# Patient Record
Sex: MALE | Race: WHITE | Employment: OTHER | URBAN - METROPOLITAN AREA
[De-identification: names, ages, dates, MRNs, and addresses within clinical notes are randomized per-mention and may not be internally consistent; named-entity substitution may affect disease eponyms.]

---

## 2017-02-23 ENCOUNTER — OFFICE VISIT (OUTPATIENT)
Dept: FAMILY MEDICINE CLINIC | Facility: CLINIC | Age: 67
End: 2017-02-23

## 2017-02-23 VITALS
WEIGHT: 197 LBS | BODY MASS INDEX: 27 KG/M2 | SYSTOLIC BLOOD PRESSURE: 110 MMHG | OXYGEN SATURATION: 98 % | TEMPERATURE: 97 F | RESPIRATION RATE: 18 BRPM | HEART RATE: 56 BPM | DIASTOLIC BLOOD PRESSURE: 76 MMHG

## 2017-02-23 DIAGNOSIS — N40.1 BPH ASSOCIATED WITH NOCTURIA: ICD-10-CM

## 2017-02-23 DIAGNOSIS — R35.1 BPH ASSOCIATED WITH NOCTURIA: ICD-10-CM

## 2017-02-23 DIAGNOSIS — Z00.00 LABORATORY EXAM ORDERED AS PART OF ROUTINE GENERAL MEDICAL EXAMINATION: Primary | ICD-10-CM

## 2017-02-23 DIAGNOSIS — Z00.00 ROUTINE GENERAL MEDICAL EXAMINATION AT A HEALTH CARE FACILITY: ICD-10-CM

## 2017-02-23 PROCEDURE — 99397 PER PM REEVAL EST PAT 65+ YR: CPT | Performed by: FAMILY MEDICINE

## 2017-02-23 RX ORDER — SILDENAFIL 100 MG/1
50 TABLET, FILM COATED ORAL AS NEEDED
Qty: 24 TABLET | Refills: 3 | Status: SHIPPED | COMMUNITY
Start: 2017-02-23 | End: 2018-02-06

## 2017-02-23 NOTE — PROGRESS NOTES
Presents for a complete physical.  About to retire in 9 months very active gentleman plays tennis regularly he is physically involved without chest pain or shortness of breath. He is up-to-date on colonoscopy which has been negative.   Only medicine is elizabeth

## 2017-03-01 ENCOUNTER — LAB ENCOUNTER (OUTPATIENT)
Dept: LAB | Age: 67
End: 2017-03-01
Attending: FAMILY MEDICINE
Payer: COMMERCIAL

## 2017-03-01 DIAGNOSIS — Z00.00 LABORATORY EXAM ORDERED AS PART OF ROUTINE GENERAL MEDICAL EXAMINATION: ICD-10-CM

## 2017-03-01 LAB
25-HYDROXYVITAMIN D (TOTAL): 28.2 NG/ML (ref 30–100)
BASOPHILS # BLD AUTO: 0.07 X10(3) UL (ref 0–0.1)
BASOPHILS NFR BLD AUTO: 1.3 %
EOSINOPHIL # BLD AUTO: 0.06 X10(3) UL (ref 0–0.3)
EOSINOPHIL NFR BLD AUTO: 1.1 %
ERYTHROCYTE [DISTWIDTH] IN BLOOD BY AUTOMATED COUNT: 13.1 % (ref 11.5–16)
HCT VFR BLD AUTO: 41.9 % (ref 37–53)
HGB BLD-MCNC: 13.4 G/DL (ref 13–17)
IMMATURE GRANULOCYTE COUNT: 0.01 X10(3) UL (ref 0–1)
IMMATURE GRANULOCYTE RATIO %: 0.2 %
LYMPHOCYTES # BLD AUTO: 1.43 X10(3) UL (ref 0.9–4)
LYMPHOCYTES NFR BLD AUTO: 26.1 %
MCH RBC QN AUTO: 31.8 PG (ref 27–33.2)
MCHC RBC AUTO-ENTMCNC: 32 G/DL (ref 31–37)
MCV RBC AUTO: 99.3 FL (ref 80–99)
MONOCYTES # BLD AUTO: 0.46 X10(3) UL (ref 0.1–0.6)
MONOCYTES NFR BLD AUTO: 8.4 %
NEUTROPHIL ABS PRELIM: 3.44 X10 (3) UL (ref 1.3–6.7)
NEUTROPHILS # BLD AUTO: 3.44 X10(3) UL (ref 1.3–6.7)
NEUTROPHILS NFR BLD AUTO: 62.9 %
PLATELET # BLD AUTO: 155 10(3)UL (ref 150–450)
RBC # BLD AUTO: 4.22 X10(6)UL (ref 3.8–5.8)
RED CELL DISTRIBUTION WIDTH-SD: 47.1 FL (ref 35.1–46.3)
WBC # BLD AUTO: 5.5 X10(3) UL (ref 4–13)

## 2017-03-01 PROCEDURE — 80061 LIPID PANEL: CPT

## 2017-03-01 PROCEDURE — 36415 COLL VENOUS BLD VENIPUNCTURE: CPT

## 2017-03-01 PROCEDURE — 84443 ASSAY THYROID STIM HORMONE: CPT

## 2017-03-01 PROCEDURE — 85025 COMPLETE CBC W/AUTO DIFF WBC: CPT

## 2017-03-01 PROCEDURE — 80053 COMPREHEN METABOLIC PANEL: CPT

## 2017-03-01 PROCEDURE — 82306 VITAMIN D 25 HYDROXY: CPT

## 2017-03-02 ENCOUNTER — APPOINTMENT (OUTPATIENT)
Dept: LAB | Facility: HOSPITAL | Age: 67
End: 2017-03-02
Attending: FAMILY MEDICINE
Payer: COMMERCIAL

## 2017-03-02 DIAGNOSIS — Z00.00 LABORATORY EXAM ORDERED AS PART OF ROUTINE GENERAL MEDICAL EXAMINATION: ICD-10-CM

## 2017-03-02 LAB
ALBUMIN SERPL-MCNC: 4.1 G/DL (ref 3.5–4.8)
ALP LIVER SERPL-CCNC: 64 U/L (ref 45–117)
ALT SERPL-CCNC: 17 U/L (ref 17–63)
AST SERPL-CCNC: 22 U/L (ref 15–41)
BILIRUB SERPL-MCNC: 0.5 MG/DL (ref 0.1–2)
BUN BLD-MCNC: 22 MG/DL (ref 8–20)
CALCIUM BLD-MCNC: 9.2 MG/DL (ref 8.3–10.3)
CHLORIDE: 109 MMOL/L (ref 101–111)
CHOLEST SMN-MCNC: 163 MG/DL (ref ?–200)
CO2: 23 MMOL/L (ref 22–32)
COMPLEXED PSA SERPL-MCNC: 2.01 NG/ML (ref 0.01–4)
CREAT BLD-MCNC: 1.29 MG/DL (ref 0.7–1.3)
GLUCOSE BLD-MCNC: 91 MG/DL (ref 70–99)
HDLC SERPL-MCNC: 59 MG/DL (ref 45–?)
HDLC SERPL: 2.76 {RATIO} (ref ?–4.97)
LDLC SERPL CALC-MCNC: 84 MG/DL (ref ?–130)
M PROTEIN MFR SERPL ELPH: 6.4 G/DL (ref 6.1–8.3)
NONHDLC SERPL-MCNC: 104 MG/DL (ref ?–130)
POTASSIUM SERPL-SCNC: 4.2 MMOL/L (ref 3.6–5.1)
SODIUM SERPL-SCNC: 143 MMOL/L (ref 136–144)
TRIGLYCERIDES: 102 MG/DL (ref ?–150)
TSI SER-ACNC: 2.95 MIU/ML (ref 0.35–5.5)
VLDL: 20 MG/DL (ref 5–40)

## 2017-03-02 PROCEDURE — 82272 OCCULT BLD FECES 1-3 TESTS: CPT

## 2017-03-03 ENCOUNTER — TELEPHONE (OUTPATIENT)
Dept: FAMILY MEDICINE CLINIC | Facility: CLINIC | Age: 67
End: 2017-03-03

## 2017-03-03 DIAGNOSIS — E55.9 VITAMIN D DEFICIENCY: Primary | ICD-10-CM

## 2017-03-03 DIAGNOSIS — R79.9 ABNORMAL BLOOD CHEMISTRY: ICD-10-CM

## 2017-03-03 NOTE — TELEPHONE ENCOUNTER
----- Message from Luis E Bradford DO sent at 3/2/2017  8:08 PM CST -----  Add 2000 units vit D3 daily      repat CBC blood count in 5-6 months    May have another vitamin deficiency    Call to arrange

## 2017-03-03 NOTE — TELEPHONE ENCOUNTER
LMTCB    Orders for repeat cbc and vitamin D to be done in 6 months has been placed in [address]

## 2017-04-06 RX ORDER — FAMCICLOVIR 125 MG/1
TABLET, FILM COATED ORAL
Qty: 14 TABLET | Refills: 0 | Status: SHIPPED | OUTPATIENT
Start: 2017-04-06 | End: 2017-04-06

## 2017-04-07 RX ORDER — FAMCICLOVIR 125 MG/1
TABLET, FILM COATED ORAL
Qty: 14 TABLET | Refills: 0 | Status: SHIPPED | OUTPATIENT
Start: 2017-04-07 | End: 2017-04-10

## 2017-04-10 RX ORDER — FAMCICLOVIR 125 MG/1
TABLET, FILM COATED ORAL
Qty: 14 TABLET | Refills: 0 | Status: SHIPPED | OUTPATIENT
Start: 2017-04-10 | End: 2018-02-06

## 2017-05-01 ENCOUNTER — OFFICE VISIT (OUTPATIENT)
Dept: FAMILY MEDICINE CLINIC | Facility: CLINIC | Age: 67
End: 2017-05-01

## 2017-05-01 VITALS
OXYGEN SATURATION: 98 % | WEIGHT: 196 LBS | RESPIRATION RATE: 20 BRPM | HEART RATE: 61 BPM | BODY MASS INDEX: 27 KG/M2 | DIASTOLIC BLOOD PRESSURE: 62 MMHG | SYSTOLIC BLOOD PRESSURE: 124 MMHG | TEMPERATURE: 98 F

## 2017-05-01 DIAGNOSIS — J31.0 RHINITIS, ATROPHIC: Primary | ICD-10-CM

## 2017-05-01 PROCEDURE — 99213 OFFICE O/P EST LOW 20 MIN: CPT | Performed by: FAMILY MEDICINE

## 2017-05-01 NOTE — PROGRESS NOTES
Her with nasal irritation some early morning I mattering has been using a Sudafed product for several weeks. No animals in the house. No chills fever nausea vomiting.     Vital signs normal    No rashes no nodes joints are spared he has a cobblestone ap

## 2017-07-18 ENCOUNTER — TELEPHONE (OUTPATIENT)
Dept: FAMILY MEDICINE CLINIC | Facility: CLINIC | Age: 67
End: 2017-07-18

## 2017-07-18 DIAGNOSIS — Z23 NEED FOR TDAP VACCINATION: Primary | ICD-10-CM

## 2017-07-18 NOTE — TELEPHONE ENCOUNTER
Patients wife states would like  to have a tdap immunization. Patients wife states he should not need an appointment for doctor to order this. Would like a call back.

## 2017-07-18 NOTE — TELEPHONE ENCOUNTER
LMOM that order for Tdap has been placed per JG and pt can call back to schedule Nurse Visit. Task completed.

## 2017-07-19 ENCOUNTER — NURSE ONLY (OUTPATIENT)
Dept: FAMILY MEDICINE CLINIC | Facility: CLINIC | Age: 67
End: 2017-07-19

## 2017-07-19 PROCEDURE — 90715 TDAP VACCINE 7 YRS/> IM: CPT | Performed by: FAMILY MEDICINE

## 2017-07-19 PROCEDURE — 90471 IMMUNIZATION ADMIN: CPT | Performed by: FAMILY MEDICINE

## 2017-09-28 RX ORDER — FAMCICLOVIR 125 MG/1
TABLET, FILM COATED ORAL
Qty: 14 TABLET | Refills: 0 | Status: SHIPPED | OUTPATIENT
Start: 2017-09-28 | End: 2017-11-20

## 2017-10-19 ENCOUNTER — IMMUNIZATION (OUTPATIENT)
Dept: FAMILY MEDICINE CLINIC | Facility: CLINIC | Age: 67
End: 2017-10-19

## 2017-10-19 ENCOUNTER — LAB ENCOUNTER (OUTPATIENT)
Dept: LAB | Age: 67
End: 2017-10-19
Attending: FAMILY MEDICINE
Payer: MEDICARE

## 2017-10-19 DIAGNOSIS — R79.9 ABNORMAL BLOOD CHEMISTRY: ICD-10-CM

## 2017-10-19 DIAGNOSIS — E55.9 VITAMIN D DEFICIENCY: ICD-10-CM

## 2017-10-19 PROCEDURE — G0008 ADMIN INFLUENZA VIRUS VAC: HCPCS | Performed by: FAMILY MEDICINE

## 2017-10-19 PROCEDURE — 85025 COMPLETE CBC W/AUTO DIFF WBC: CPT | Performed by: FAMILY MEDICINE

## 2017-10-19 PROCEDURE — 36415 COLL VENOUS BLD VENIPUNCTURE: CPT | Performed by: FAMILY MEDICINE

## 2017-10-19 PROCEDURE — 90653 IIV ADJUVANT VACCINE IM: CPT | Performed by: FAMILY MEDICINE

## 2017-10-19 PROCEDURE — 82306 VITAMIN D 25 HYDROXY: CPT | Performed by: FAMILY MEDICINE

## 2017-10-20 ENCOUNTER — MED REC SCAN ONLY (OUTPATIENT)
Dept: FAMILY MEDICINE CLINIC | Facility: CLINIC | Age: 67
End: 2017-10-20

## 2017-11-20 RX ORDER — FAMCICLOVIR 125 MG/1
TABLET, FILM COATED ORAL
Qty: 14 TABLET | Refills: 0 | Status: SHIPPED | OUTPATIENT
Start: 2017-11-20 | End: 2018-05-08

## 2017-12-01 RX ORDER — TERAZOSIN 2 MG/1
CAPSULE ORAL
Qty: 90 CAPSULE | Refills: 3 | Status: CANCELLED | OUTPATIENT
Start: 2017-12-01

## 2017-12-04 ENCOUNTER — TELEPHONE (OUTPATIENT)
Dept: FAMILY MEDICINE CLINIC | Facility: CLINIC | Age: 67
End: 2017-12-04

## 2017-12-04 RX ORDER — TERAZOSIN 2 MG/1
CAPSULE ORAL
Qty: 90 CAPSULE | Refills: 3 | Status: SHIPPED | OUTPATIENT
Start: 2017-12-04 | End: 2018-11-21

## 2018-02-06 ENCOUNTER — OFFICE VISIT (OUTPATIENT)
Dept: FAMILY MEDICINE CLINIC | Facility: CLINIC | Age: 68
End: 2018-02-06

## 2018-02-06 VITALS
OXYGEN SATURATION: 98 % | BODY MASS INDEX: 26.14 KG/M2 | WEIGHT: 193 LBS | SYSTOLIC BLOOD PRESSURE: 108 MMHG | DIASTOLIC BLOOD PRESSURE: 60 MMHG | RESPIRATION RATE: 18 BRPM | HEIGHT: 72 IN | HEART RATE: 47 BPM | TEMPERATURE: 98 F

## 2018-02-06 DIAGNOSIS — Z00.00 ROUTINE GENERAL MEDICAL EXAMINATION AT A HEALTH CARE FACILITY: Primary | ICD-10-CM

## 2018-02-06 DIAGNOSIS — Z00.00 ENCOUNTER FOR ANNUAL HEALTH EXAMINATION: ICD-10-CM

## 2018-02-06 DIAGNOSIS — N52.9 IMPOTENCE OF ORGANIC ORIGIN: ICD-10-CM

## 2018-02-06 DIAGNOSIS — R35.1 BENIGN PROSTATIC HYPERPLASIA WITH NOCTURIA: ICD-10-CM

## 2018-02-06 DIAGNOSIS — N40.1 BENIGN PROSTATIC HYPERPLASIA WITH NOCTURIA: ICD-10-CM

## 2018-02-06 DIAGNOSIS — B00.1 RECURRENT COLD SORES: ICD-10-CM

## 2018-02-06 PROCEDURE — G0402 INITIAL PREVENTIVE EXAM: HCPCS | Performed by: FAMILY MEDICINE

## 2018-02-06 PROCEDURE — 99397 PER PM REEVAL EST PAT 65+ YR: CPT | Performed by: FAMILY MEDICINE

## 2018-02-06 PROCEDURE — 96160 PT-FOCUSED HLTH RISK ASSMT: CPT | Performed by: FAMILY MEDICINE

## 2018-02-06 RX ORDER — SILDENAFIL 50 MG/1
50 TABLET, FILM COATED ORAL AS NEEDED
Qty: 8 TABLET | Refills: 12 | Status: SHIPPED | OUTPATIENT
Start: 2018-02-06 | End: 2021-03-09 | Stop reason: ALTCHOICE

## 2018-02-06 NOTE — PATIENT INSTRUCTIONS
Karma Hatchet Stallman's SCREENING SCHEDULE   Tests on this list are recommended by your physician but may not be covered, or covered at this frequency, by your insurer. Please check with your insurance carrier before scheduling to verify coverage.     PREVENT more often if abnormal Colonoscopy,5 Years due on 03/18/2020 Update Health Maintenance if applicable    Flex Sigmoidoscopy Screen  Covered every 5 years No results found for this or any previous visit. No flowsheet data found.      Fecal Occult Blood   [place] covered by Medicare Part B)   Orders placed or performed in visit on 07/18/17  -TETANUS, DIPHTHERIA TOXOIDS AND ACELLULAR PERTUSIS VACCINE (TDAP), >7 YEARS, IM USE    This may be covered with your prescription benefits, but Medicare does not cover unless M

## 2018-02-06 NOTE — PROGRESS NOTES
HPI:   Ryley Roberto is a 79year old male who presents for a MA (Medicare Advantage) [facility] (Once per calendar year). Request refill of ED medication. Takes medicine for benign prostatic hypertrophy.   Occasionally takes medication for cold DO as PCP - General (Family Practice)    Patient Active Problem List:     Impotence of organic origin     Recurrent cold sores     Cherry angioma     Benign prostatic hyperplasia with nocturia    Wt Readings from Last 3 Encounters:  02/06/18 : 193 lb  05/0 congestion, sinus pain or ST, mild hearing loss in the left ear. He wears ear protection whenever using a saw or snowblower. He is not certain where this came from but it has been documented previously.   LUNGS: denies shortness of breath with exertion  C Wall:  No tenderness or deformity   Heart:  Regular rate and rhythm, S1, S2 normal, no murmur, rub or gallop   Abdomen:   Soft, non-tender, bowel sounds active all four quadrants,  no masses, no organomegaly   Genitalia: Normal male       Extremities: Extr to the plan. Reinforced healthy diet, lifestyle, and exercise. Return in 1 year (on 2/6/2019).      Erlinda Luna MD, 2/6/2018     General Health     In the past six months, have you lost more than 10 pounds without trying?: 2 - No  Has your appetite 10/19/2017   Please get every year    Pneumococcal 13 (Prevnar)  Covered Once after 65 01/07/2016 Please get once after your 65th birthday    Pneumococcal 23 (Pneumovax)  Covered Once after 65 11/18/2010 Please get once after your 65th birthday    Mark Claire

## 2018-05-08 RX ORDER — FAMCICLOVIR 125 MG/1
TABLET, FILM COATED ORAL
Qty: 14 TABLET | Refills: 0 | Status: SHIPPED | OUTPATIENT
Start: 2018-05-08 | End: 2018-07-23

## 2018-07-21 ENCOUNTER — OFFICE VISIT (OUTPATIENT)
Dept: FAMILY MEDICINE CLINIC | Facility: CLINIC | Age: 68
End: 2018-07-21
Payer: COMMERCIAL

## 2018-07-21 VITALS
RESPIRATION RATE: 16 BRPM | WEIGHT: 195 LBS | HEART RATE: 56 BPM | TEMPERATURE: 99 F | BODY MASS INDEX: 26.41 KG/M2 | DIASTOLIC BLOOD PRESSURE: 76 MMHG | HEIGHT: 72 IN | SYSTOLIC BLOOD PRESSURE: 128 MMHG

## 2018-07-21 DIAGNOSIS — B02.9 HERPES ZOSTER WITHOUT COMPLICATION: Primary | ICD-10-CM

## 2018-07-21 PROCEDURE — 99213 OFFICE O/P EST LOW 20 MIN: CPT | Performed by: FAMILY MEDICINE

## 2018-07-21 NOTE — PROGRESS NOTES
HPI:    Patient ID: Herman Love is a 76year old male. Shingles   This is a new problem. Episode onset: pt noticed it 1 week ago. The problem is unchanged.  Location: Right mid thoracic going towards the right chest. The rash is characterized by re management    1. Herpes zoster without complication  - triamcinolone acetonide 0.1 % External Cream; Apply topically 2 (two) times daily as needed.   Dispense: 60 g; Refill: 3    Meds This Visit:  Signed Prescriptions Disp Refills    triamcinolone acetonide

## 2018-07-23 RX ORDER — FAMCICLOVIR 125 MG/1
TABLET, FILM COATED ORAL
Qty: 14 TABLET | Refills: 0 | Status: SHIPPED | OUTPATIENT
Start: 2018-07-23 | End: 2018-08-24

## 2018-08-24 RX ORDER — FAMCICLOVIR 125 MG/1
TABLET, FILM COATED ORAL
Qty: 14 TABLET | Refills: 0 | Status: SHIPPED | OUTPATIENT
Start: 2018-08-24 | End: 2018-12-18

## 2018-11-21 RX ORDER — TERAZOSIN 2 MG/1
CAPSULE ORAL
Qty: 90 CAPSULE | Refills: 2 | Status: SHIPPED | OUTPATIENT
Start: 2018-11-21 | End: 2019-09-03

## 2018-12-18 RX ORDER — FAMCICLOVIR 125 MG/1
TABLET, FILM COATED ORAL
Qty: 14 TABLET | Refills: 0 | Status: SHIPPED | OUTPATIENT
Start: 2018-12-18 | End: 2019-03-08

## 2019-02-13 ENCOUNTER — MA CHART PREP (OUTPATIENT)
Dept: FAMILY MEDICINE CLINIC | Facility: CLINIC | Age: 69
End: 2019-02-13

## 2019-02-28 ENCOUNTER — OFFICE VISIT (OUTPATIENT)
Dept: FAMILY MEDICINE CLINIC | Facility: CLINIC | Age: 69
End: 2019-02-28
Payer: COMMERCIAL

## 2019-02-28 VITALS
HEIGHT: 72 IN | DIASTOLIC BLOOD PRESSURE: 74 MMHG | RESPIRATION RATE: 16 BRPM | HEART RATE: 50 BPM | OXYGEN SATURATION: 98 % | WEIGHT: 193 LBS | TEMPERATURE: 98 F | BODY MASS INDEX: 26.14 KG/M2 | SYSTOLIC BLOOD PRESSURE: 112 MMHG

## 2019-02-28 DIAGNOSIS — Z13.220 ENCOUNTER FOR LIPID SCREENING FOR CARDIOVASCULAR DISEASE: ICD-10-CM

## 2019-02-28 DIAGNOSIS — Z11.59 ENCOUNTER FOR HEPATITIS C SCREENING TEST FOR LOW RISK PATIENT: ICD-10-CM

## 2019-02-28 DIAGNOSIS — Z13.6 ENCOUNTER FOR LIPID SCREENING FOR CARDIOVASCULAR DISEASE: ICD-10-CM

## 2019-02-28 DIAGNOSIS — Z12.5 ENCOUNTER FOR SCREENING FOR MALIGNANT NEOPLASM OF PROSTATE: ICD-10-CM

## 2019-02-28 DIAGNOSIS — Z12.11 SCREENING FOR MALIGNANT NEOPLASM OF COLON: ICD-10-CM

## 2019-02-28 DIAGNOSIS — Z00.00 MEDICARE ANNUAL WELLNESS VISIT, SUBSEQUENT: ICD-10-CM

## 2019-02-28 DIAGNOSIS — E55.9 HYPOVITAMINOSIS D: ICD-10-CM

## 2019-02-28 DIAGNOSIS — R00.1 BRADYCARDIA: Primary | ICD-10-CM

## 2019-02-28 DIAGNOSIS — Z13.29 SCREENING FOR THYROID DISORDER: ICD-10-CM

## 2019-02-28 PROCEDURE — 99397 PER PM REEVAL EST PAT 65+ YR: CPT | Performed by: FAMILY MEDICINE

## 2019-02-28 PROCEDURE — G0438 PPPS, INITIAL VISIT: HCPCS | Performed by: FAMILY MEDICINE

## 2019-02-28 PROCEDURE — 96160 PT-FOCUSED HLTH RISK ASSMT: CPT | Performed by: FAMILY MEDICINE

## 2019-02-28 NOTE — PROGRESS NOTES
Patient presents for annual super visit by Medicare. Please refer to the template form. Please refer to the results of screening that included tobacco use and alcohol use, depression, cognitive impairment, safety and functionality, and nutrition.

## 2019-03-04 PROCEDURE — 82274 ASSAY TEST FOR BLOOD FECAL: CPT

## 2019-03-08 RX ORDER — FAMCICLOVIR 125 MG/1
TABLET, FILM COATED ORAL
Qty: 14 TABLET | Refills: 0 | Status: SHIPPED | OUTPATIENT
Start: 2019-03-08 | End: 2020-04-29

## 2019-03-11 ENCOUNTER — LABORATORY ENCOUNTER (OUTPATIENT)
Dept: LAB | Age: 69
End: 2019-03-11
Attending: FAMILY MEDICINE
Payer: MEDICARE

## 2019-03-11 DIAGNOSIS — E55.9 HYPOVITAMINOSIS D: ICD-10-CM

## 2019-03-11 DIAGNOSIS — Z13.6 ENCOUNTER FOR LIPID SCREENING FOR CARDIOVASCULAR DISEASE: ICD-10-CM

## 2019-03-11 DIAGNOSIS — Z12.5 ENCOUNTER FOR SCREENING FOR MALIGNANT NEOPLASM OF PROSTATE: ICD-10-CM

## 2019-03-11 DIAGNOSIS — R00.1 BRADYCARDIA: ICD-10-CM

## 2019-03-11 DIAGNOSIS — Z11.59 ENCOUNTER FOR HEPATITIS C SCREENING TEST FOR LOW RISK PATIENT: ICD-10-CM

## 2019-03-11 DIAGNOSIS — Z13.220 ENCOUNTER FOR LIPID SCREENING FOR CARDIOVASCULAR DISEASE: ICD-10-CM

## 2019-03-11 DIAGNOSIS — Z13.29 SCREENING FOR THYROID DISORDER: ICD-10-CM

## 2019-03-11 LAB
ALBUMIN SERPL-MCNC: 4.3 G/DL (ref 3.4–5)
ALBUMIN/GLOB SERPL: 1.7 {RATIO} (ref 1–2)
ALP LIVER SERPL-CCNC: 63 U/L (ref 45–117)
ALT SERPL-CCNC: 20 U/L (ref 16–61)
ANION GAP SERPL CALC-SCNC: 7 MMOL/L (ref 0–18)
AST SERPL-CCNC: 18 U/L (ref 15–37)
BASOPHILS # BLD AUTO: 0.07 X10(3) UL (ref 0–0.2)
BASOPHILS NFR BLD AUTO: 1.2 %
BILIRUB SERPL-MCNC: 0.7 MG/DL (ref 0.1–2)
BUN BLD-MCNC: 21 MG/DL (ref 7–18)
BUN/CREAT SERPL: 15.8 (ref 10–20)
CALCIUM BLD-MCNC: 9.2 MG/DL (ref 8.5–10.1)
CHLORIDE SERPL-SCNC: 111 MMOL/L (ref 98–107)
CHOLEST SMN-MCNC: 185 MG/DL (ref ?–200)
CO2 SERPL-SCNC: 27 MMOL/L (ref 21–32)
COMPLEXED PSA SERPL-MCNC: 2.63 NG/ML (ref ?–4)
CREAT BLD-MCNC: 1.33 MG/DL (ref 0.7–1.3)
DEPRECATED RDW RBC AUTO: 46.5 FL (ref 35.1–46.3)
EOSINOPHIL # BLD AUTO: 0.08 X10(3) UL (ref 0–0.7)
EOSINOPHIL NFR BLD AUTO: 1.4 %
ERYTHROCYTE [DISTWIDTH] IN BLOOD BY AUTOMATED COUNT: 12.9 % (ref 11–15)
GLOBULIN PLAS-MCNC: 2.6 G/DL (ref 2.8–4.4)
GLUCOSE BLD-MCNC: 93 MG/DL (ref 70–99)
HCT VFR BLD AUTO: 45.3 % (ref 39–53)
HCV AB SERPL QL IA: NONREACTIVE
HDLC SERPL-MCNC: 53 MG/DL (ref 40–59)
HGB BLD-MCNC: 15 G/DL (ref 13–17.5)
IMM GRANULOCYTES # BLD AUTO: 0.01 X10(3) UL (ref 0–1)
IMM GRANULOCYTES NFR BLD: 0.2 %
LDLC SERPL CALC-MCNC: 114 MG/DL (ref ?–100)
LYMPHOCYTES # BLD AUTO: 1.85 X10(3) UL (ref 1–4)
LYMPHOCYTES NFR BLD AUTO: 32.1 %
M PROTEIN MFR SERPL ELPH: 6.9 G/DL (ref 6.4–8.2)
MCH RBC QN AUTO: 32.2 PG (ref 26–34)
MCHC RBC AUTO-ENTMCNC: 33.1 G/DL (ref 31–37)
MCV RBC AUTO: 97.2 FL (ref 80–100)
MONOCYTES # BLD AUTO: 0.53 X10(3) UL (ref 0.1–1)
MONOCYTES NFR BLD AUTO: 9.2 %
NEUTROPHILS # BLD AUTO: 3.22 X10 (3) UL (ref 1.5–7.7)
NEUTROPHILS # BLD AUTO: 3.22 X10(3) UL (ref 1.5–7.7)
NEUTROPHILS NFR BLD AUTO: 55.9 %
NONHDLC SERPL-MCNC: 132 MG/DL (ref ?–130)
OSMOLALITY SERPL CALC.SUM OF ELEC: 303 MOSM/KG (ref 275–295)
PLATELET # BLD AUTO: 182 10(3)UL (ref 150–450)
POTASSIUM SERPL-SCNC: 3.9 MMOL/L (ref 3.5–5.1)
RBC # BLD AUTO: 4.66 X10(6)UL (ref 3.8–5.8)
SODIUM SERPL-SCNC: 145 MMOL/L (ref 136–145)
TRIGL SERPL-MCNC: 90 MG/DL (ref 30–149)
TSI SER-ACNC: 3.27 MIU/ML (ref 0.36–3.74)
VIT D+METAB SERPL-MCNC: 51.9 NG/ML (ref 30–100)
VLDLC SERPL CALC-MCNC: 18 MG/DL (ref 0–30)
WBC # BLD AUTO: 5.8 X10(3) UL (ref 4–11)

## 2019-03-11 PROCEDURE — 85025 COMPLETE CBC W/AUTO DIFF WBC: CPT

## 2019-03-11 PROCEDURE — 36415 COLL VENOUS BLD VENIPUNCTURE: CPT

## 2019-03-11 PROCEDURE — 84443 ASSAY THYROID STIM HORMONE: CPT

## 2019-03-11 PROCEDURE — 80061 LIPID PANEL: CPT

## 2019-03-11 PROCEDURE — 80053 COMPREHEN METABOLIC PANEL: CPT

## 2019-03-11 PROCEDURE — 86803 HEPATITIS C AB TEST: CPT

## 2019-03-11 PROCEDURE — 82306 VITAMIN D 25 HYDROXY: CPT

## 2019-03-15 ENCOUNTER — TELEPHONE (OUTPATIENT)
Dept: FAMILY MEDICINE CLINIC | Facility: CLINIC | Age: 69
End: 2019-03-15

## 2019-03-15 DIAGNOSIS — R79.9 ABNORMAL BLOOD CHEMISTRY: Primary | ICD-10-CM

## 2019-03-15 NOTE — TELEPHONE ENCOUNTER
----- Message from Yessi Navarro DO sent at 3/14/2019  8:19 PM CDT -----  Blood tests look fine repeat creatinine in 3-4 months       jg

## 2019-03-22 ENCOUNTER — APPOINTMENT (OUTPATIENT)
Dept: LAB | Age: 69
End: 2019-03-22
Attending: FAMILY MEDICINE
Payer: MEDICARE

## 2019-03-22 DIAGNOSIS — Z12.11 SCREENING FOR MALIGNANT NEOPLASM OF COLON: ICD-10-CM

## 2019-05-20 RX ORDER — FAMCICLOVIR 125 MG/1
TABLET, FILM COATED ORAL
Qty: 14 TABLET | Refills: 0 | Status: SHIPPED | OUTPATIENT
Start: 2019-05-20 | End: 2019-11-29

## 2019-09-03 RX ORDER — TERAZOSIN 2 MG/1
CAPSULE ORAL
Qty: 90 CAPSULE | Refills: 1 | Status: SHIPPED | OUTPATIENT
Start: 2019-09-03 | End: 2020-01-24

## 2019-10-14 ENCOUNTER — TELEPHONE (OUTPATIENT)
Dept: FAMILY MEDICINE CLINIC | Facility: CLINIC | Age: 69
End: 2019-10-14

## 2019-11-29 RX ORDER — FAMCICLOVIR 125 MG/1
TABLET, FILM COATED ORAL
Qty: 14 TABLET | Refills: 0 | Status: SHIPPED | OUTPATIENT
Start: 2019-11-29 | End: 2020-01-08

## 2019-12-03 ENCOUNTER — OFFICE VISIT (OUTPATIENT)
Dept: FAMILY MEDICINE CLINIC | Facility: CLINIC | Age: 69
End: 2019-12-03
Payer: COMMERCIAL

## 2019-12-03 VITALS
SYSTOLIC BLOOD PRESSURE: 130 MMHG | OXYGEN SATURATION: 98 % | HEIGHT: 72 IN | DIASTOLIC BLOOD PRESSURE: 60 MMHG | RESPIRATION RATE: 16 BRPM | WEIGHT: 197 LBS | HEART RATE: 44 BPM | BODY MASS INDEX: 26.68 KG/M2 | TEMPERATURE: 98 F

## 2019-12-03 DIAGNOSIS — M54.50 ACUTE LEFT-SIDED LOW BACK PAIN WITHOUT SCIATICA: Primary | ICD-10-CM

## 2019-12-03 PROCEDURE — 99214 OFFICE O/P EST MOD 30 MIN: CPT | Performed by: INTERNAL MEDICINE

## 2019-12-03 RX ORDER — IBUPROFEN 800 MG/1
TABLET ORAL
Qty: 30 TABLET | Refills: 0 | Status: SHIPPED | OUTPATIENT
Start: 2019-12-03

## 2019-12-03 RX ORDER — PREDNISONE 50 MG/1
TABLET ORAL
Qty: 5 TABLET | Refills: 0 | Status: SHIPPED | OUTPATIENT
Start: 2019-12-03 | End: 2021-03-09 | Stop reason: ALTCHOICE

## 2019-12-03 NOTE — PROGRESS NOTES
HPI:   Ace Bowman is a 71year old male here for complaints of back pain. Pain is located at left low back. Pain is described as aching. Severity is moderate. The pain radiates to no radiation of pain.  Pain was precipitated by raking leaves, felt some t

## 2020-01-08 RX ORDER — FAMCICLOVIR 125 MG/1
TABLET, FILM COATED ORAL
Qty: 14 TABLET | Refills: 0 | Status: SHIPPED | OUTPATIENT
Start: 2020-01-08 | End: 2020-04-29

## 2020-01-20 ENCOUNTER — NURSE ONLY (OUTPATIENT)
Dept: FAMILY MEDICINE CLINIC | Facility: CLINIC | Age: 70
End: 2020-01-20
Payer: COMMERCIAL

## 2020-01-20 PROCEDURE — 90471 IMMUNIZATION ADMIN: CPT | Performed by: FAMILY MEDICINE

## 2020-01-20 PROCEDURE — 90750 HZV VACC RECOMBINANT IM: CPT | Performed by: FAMILY MEDICINE

## 2020-01-24 RX ORDER — TERAZOSIN 2 MG/1
CAPSULE ORAL
Qty: 90 CAPSULE | Refills: 0 | Status: SHIPPED | OUTPATIENT
Start: 2020-01-24 | End: 2020-06-05

## 2020-03-03 ENCOUNTER — HOSPITAL ENCOUNTER (OUTPATIENT)
Dept: GENERAL RADIOLOGY | Age: 70
Discharge: HOME | End: 2020-03-03
Attending: FAMILY MEDICINE
Payer: MEDICARE

## 2020-03-03 ENCOUNTER — OFFICE VISIT (OUTPATIENT)
Dept: FAMILY MEDICINE CLINIC | Facility: CLINIC | Age: 70
End: 2020-03-03
Payer: COMMERCIAL

## 2020-03-03 VITALS
DIASTOLIC BLOOD PRESSURE: 64 MMHG | HEIGHT: 72 IN | BODY MASS INDEX: 27.09 KG/M2 | RESPIRATION RATE: 18 BRPM | TEMPERATURE: 99 F | WEIGHT: 200 LBS | SYSTOLIC BLOOD PRESSURE: 122 MMHG | OXYGEN SATURATION: 98 % | HEART RATE: 39 BPM

## 2020-03-03 DIAGNOSIS — N40.1 BENIGN PROSTATIC HYPERPLASIA WITH NOCTURIA: ICD-10-CM

## 2020-03-03 DIAGNOSIS — Z12.5 PROSTATE CANCER SCREENING: ICD-10-CM

## 2020-03-03 DIAGNOSIS — M54.50 ACUTE LEFT-SIDED LOW BACK PAIN WITHOUT SCIATICA: ICD-10-CM

## 2020-03-03 DIAGNOSIS — N52.9 IMPOTENCE OF ORGANIC ORIGIN: ICD-10-CM

## 2020-03-03 DIAGNOSIS — Z87.19 H/O BILATERAL INGUINAL HERNIA REPAIR: ICD-10-CM

## 2020-03-03 DIAGNOSIS — R10.12 LUQ ABDOMINAL PAIN: ICD-10-CM

## 2020-03-03 DIAGNOSIS — Z00.00 ANNUAL PHYSICAL EXAM: Primary | ICD-10-CM

## 2020-03-03 DIAGNOSIS — R35.1 BENIGN PROSTATIC HYPERPLASIA WITH NOCTURIA: ICD-10-CM

## 2020-03-03 DIAGNOSIS — N18.30 CKD (CHRONIC KIDNEY DISEASE) STAGE 3, GFR 30-59 ML/MIN (HCC): Chronic | ICD-10-CM

## 2020-03-03 DIAGNOSIS — Z98.890 H/O BILATERAL INGUINAL HERNIA REPAIR: ICD-10-CM

## 2020-03-03 DIAGNOSIS — Z12.11 COLON CANCER SCREENING: ICD-10-CM

## 2020-03-03 PROCEDURE — 90471 IMMUNIZATION ADMIN: CPT | Performed by: FAMILY MEDICINE

## 2020-03-03 PROCEDURE — G0439 PPPS, SUBSEQ VISIT: HCPCS | Performed by: FAMILY MEDICINE

## 2020-03-03 PROCEDURE — 90750 HZV VACC RECOMBINANT IM: CPT | Performed by: FAMILY MEDICINE

## 2020-03-03 PROCEDURE — 99397 PER PM REEVAL EST PAT 65+ YR: CPT | Performed by: FAMILY MEDICINE

## 2020-03-03 PROCEDURE — 96160 PT-FOCUSED HLTH RISK ASSMT: CPT | Performed by: FAMILY MEDICINE

## 2020-03-03 PROCEDURE — 74018 RADEX ABDOMEN 1 VIEW: CPT | Performed by: FAMILY MEDICINE

## 2020-03-03 PROCEDURE — 71046 X-RAY EXAM CHEST 2 VIEWS: CPT | Performed by: FAMILY MEDICINE

## 2020-03-03 NOTE — PROGRESS NOTES
HPI:   Rashad Hu is a 79year old male who presents for a MA (Medicare Advantage) [facility] (Once per calendar year). Hx of smoking. This occurred for less that 1 year. He stopped in 1973. Impotence   This is a chronic problem.  The problem on Assessment   He has been screened for Falls and is low risk: Fall/Risk Scorin    Cognitive Assessment   He had a completely normal cognitive assessment- see flowsheet entries    Functional Ability/Status   Trendsamra Mercer has a completely normal func Cholesterol Labs:   Lab Results   Component Value Date    CHOLEST 170 03/04/2020    HDL 54 03/04/2020     (H) 03/04/2020    TRIG 69 03/04/2020          Last Chemistry Labs:   Lab Results   Component Value Date    AST 16 03/04/2020    ALT 18 03/04/20 nocturia, no complaint of urinary incontinence  MUSCULOSKELETAL: denies back pain  NEURO: denies headaches  PSYCHE: denies depression or anxiety  HEMATOLOGIC: denies hx of anemia  ENDOCRINE: denies thyroid history  ALL/ASTHMA: denies hx of allergy or asthm ASSESSMENT AND OTHER RELEVANT CHRONIC CONDITIONS:   Cruz Ritchie is a 79year old male who presents for a Medicare Assessment. 1. Annual physical exam  Due for repeat blood work at this time. Follow up based on results.   - LIPID PANEL  - PSA S PSA SCREEN; Future    Benign prostatic hyperplasia with nocturia    CKD (chronic kidney disease) stage 3, GFR 30-59 ml/min ([facility])    H/O bilateral inguinal hernia repair    Impotence of organic origin    Other orders  -     ZOSTER VACC RECOMBINANT IM NJX Glaucoma Screening      Ophthalmology Visit Annually: Diabetics, FHx Glaucoma, AA>50, > 65 No flowsheet data found.     Prostate Cancer Screening      PSA  Annually PSA due on 03/11/2021  Update Health Maintenance if applicable     Immunizations (

## 2020-03-04 ENCOUNTER — APPOINTMENT (OUTPATIENT)
Dept: LAB | Age: 70
End: 2020-03-04
Attending: FAMILY MEDICINE
Payer: MEDICARE

## 2020-03-04 DIAGNOSIS — Z00.00 ANNUAL PHYSICAL EXAM: ICD-10-CM

## 2020-03-04 DIAGNOSIS — Z12.5 PROSTATE CANCER SCREENING: ICD-10-CM

## 2020-03-04 LAB
ALBUMIN SERPL-MCNC: 4.2 G/DL (ref 3.4–5)
ALBUMIN/GLOB SERPL: 1.6 {RATIO} (ref 1–2)
ALP LIVER SERPL-CCNC: 52 U/L (ref 45–117)
ALT SERPL-CCNC: 18 U/L (ref 16–61)
ANION GAP SERPL CALC-SCNC: 7 MMOL/L (ref 0–18)
AST SERPL-CCNC: 16 U/L (ref 15–37)
BASOPHILS # BLD AUTO: 0.04 X10(3) UL (ref 0–0.2)
BASOPHILS NFR BLD AUTO: 0.6 %
BILIRUB SERPL-MCNC: 1.1 MG/DL (ref 0.1–2)
BUN BLD-MCNC: 19 MG/DL (ref 7–18)
BUN/CREAT SERPL: 15.1 (ref 10–20)
CALCIUM BLD-MCNC: 8.9 MG/DL (ref 8.5–10.1)
CHLORIDE SERPL-SCNC: 109 MMOL/L (ref 98–112)
CHOLEST SMN-MCNC: 170 MG/DL (ref ?–200)
CO2 SERPL-SCNC: 25 MMOL/L (ref 21–32)
COMPLEXED PSA SERPL-MCNC: 2.56 NG/ML (ref ?–4)
CREAT BLD-MCNC: 1.26 MG/DL (ref 0.7–1.3)
DEPRECATED RDW RBC AUTO: 46.5 FL (ref 35.1–46.3)
EOSINOPHIL # BLD AUTO: 0.06 X10(3) UL (ref 0–0.7)
EOSINOPHIL NFR BLD AUTO: 0.9 %
ERYTHROCYTE [DISTWIDTH] IN BLOOD BY AUTOMATED COUNT: 12.9 % (ref 11–15)
GLOBULIN PLAS-MCNC: 2.7 G/DL (ref 2.8–4.4)
GLUCOSE BLD-MCNC: 91 MG/DL (ref 70–99)
HCT VFR BLD AUTO: 43.2 % (ref 39–53)
HDLC SERPL-MCNC: 54 MG/DL (ref 40–59)
HGB BLD-MCNC: 14.2 G/DL (ref 13–17.5)
IMM GRANULOCYTES # BLD AUTO: 0.02 X10(3) UL (ref 0–1)
IMM GRANULOCYTES NFR BLD: 0.3 %
LDLC SERPL CALC-MCNC: 102 MG/DL (ref ?–100)
LYMPHOCYTES # BLD AUTO: 1.1 X10(3) UL (ref 1–4)
LYMPHOCYTES NFR BLD AUTO: 15.9 %
M PROTEIN MFR SERPL ELPH: 6.9 G/DL (ref 6.4–8.2)
MCH RBC QN AUTO: 32.1 PG (ref 26–34)
MCHC RBC AUTO-ENTMCNC: 32.9 G/DL (ref 31–37)
MCV RBC AUTO: 97.7 FL (ref 80–100)
MONOCYTES # BLD AUTO: 0.64 X10(3) UL (ref 0.1–1)
MONOCYTES NFR BLD AUTO: 9.2 %
NEUTROPHILS # BLD AUTO: 5.07 X10 (3) UL (ref 1.5–7.7)
NEUTROPHILS # BLD AUTO: 5.07 X10(3) UL (ref 1.5–7.7)
NEUTROPHILS NFR BLD AUTO: 73.1 %
NONHDLC SERPL-MCNC: 116 MG/DL (ref ?–130)
OSMOLALITY SERPL CALC.SUM OF ELEC: 294 MOSM/KG (ref 275–295)
PATIENT FASTING Y/N/NP: YES
PATIENT FASTING Y/N/NP: YES
PLATELET # BLD AUTO: 137 10(3)UL (ref 150–450)
POTASSIUM SERPL-SCNC: 3.8 MMOL/L (ref 3.5–5.1)
RBC # BLD AUTO: 4.42 X10(6)UL (ref 3.8–5.8)
SODIUM SERPL-SCNC: 141 MMOL/L (ref 136–145)
TRIGL SERPL-MCNC: 69 MG/DL (ref 30–149)
VLDLC SERPL CALC-MCNC: 14 MG/DL (ref 0–30)
WBC # BLD AUTO: 6.9 X10(3) UL (ref 4–11)

## 2020-03-04 PROCEDURE — 36415 COLL VENOUS BLD VENIPUNCTURE: CPT | Performed by: FAMILY MEDICINE

## 2020-03-04 PROCEDURE — 80053 COMPREHEN METABOLIC PANEL: CPT | Performed by: FAMILY MEDICINE

## 2020-03-04 PROCEDURE — 85025 COMPLETE CBC W/AUTO DIFF WBC: CPT | Performed by: FAMILY MEDICINE

## 2020-03-04 PROCEDURE — 80061 LIPID PANEL: CPT | Performed by: FAMILY MEDICINE

## 2020-03-20 ENCOUNTER — TELEPHONE (OUTPATIENT)
Dept: FAMILY MEDICINE CLINIC | Facility: CLINIC | Age: 70
End: 2020-03-20

## 2020-04-29 RX ORDER — FAMCICLOVIR 125 MG/1
TABLET, FILM COATED ORAL
Qty: 14 TABLET | Refills: 0 | Status: SHIPPED | OUTPATIENT
Start: 2020-04-29 | End: 2020-07-13

## 2020-06-05 RX ORDER — TERAZOSIN 2 MG/1
CAPSULE ORAL
Qty: 90 CAPSULE | Refills: 0 | Status: SHIPPED | OUTPATIENT
Start: 2020-06-05 | End: 2020-09-02

## 2020-07-03 ENCOUNTER — LAB ENCOUNTER (OUTPATIENT)
Dept: LAB | Facility: HOSPITAL | Age: 70
End: 2020-07-03
Attending: INTERNAL MEDICINE
Payer: MEDICARE

## 2020-07-03 DIAGNOSIS — Z01.818 PRE-OP TESTING: ICD-10-CM

## 2020-07-03 LAB — SARS-COV-2 RNA RESP QL NAA+PROBE: NOT DETECTED

## 2020-07-06 PROBLEM — D12.2 BENIGN NEOPLASM OF ASCENDING COLON: Status: ACTIVE | Noted: 2020-07-06

## 2020-07-06 PROBLEM — Z86.0101 HISTORY OF ADENOMATOUS POLYP OF COLON: Status: ACTIVE | Noted: 2020-07-06

## 2020-07-06 PROBLEM — Z86.010 HISTORY OF ADENOMATOUS POLYP OF COLON: Status: ACTIVE | Noted: 2020-07-06

## 2020-07-13 RX ORDER — FAMCICLOVIR 125 MG/1
TABLET, FILM COATED ORAL
Qty: 14 TABLET | Refills: 0 | Status: SHIPPED | OUTPATIENT
Start: 2020-07-13 | End: 2020-10-12

## 2020-09-02 RX ORDER — TERAZOSIN 2 MG/1
CAPSULE ORAL
Qty: 90 CAPSULE | Refills: 0 | Status: SHIPPED | OUTPATIENT
Start: 2020-09-02 | End: 2020-12-01

## 2020-10-12 RX ORDER — FAMCICLOVIR 125 MG/1
TABLET ORAL
Qty: 14 TABLET | Refills: 0 | Status: SHIPPED | OUTPATIENT
Start: 2020-10-12 | End: 2021-03-09 | Stop reason: ALTCHOICE

## 2020-10-12 NOTE — TELEPHONE ENCOUNTER
Rx Request  FAMCICLOVIR 125 MG Oral Tab    Disp:      14              R: 0    Last Visit: 03/03/2020    Last Refilled: 07/13/2020    Protocol Passed?  Yes[ x ]       No[  ]

## 2020-12-01 RX ORDER — TERAZOSIN 2 MG/1
CAPSULE ORAL
Qty: 90 CAPSULE | Refills: 0 | Status: SHIPPED | OUTPATIENT
Start: 2020-12-01 | End: 2021-03-01

## 2021-03-01 RX ORDER — TERAZOSIN 2 MG/1
CAPSULE ORAL
Qty: 90 CAPSULE | Refills: 0 | Status: SHIPPED | OUTPATIENT
Start: 2021-03-01 | End: 2021-03-09

## 2021-03-09 ENCOUNTER — OFFICE VISIT (OUTPATIENT)
Dept: FAMILY MEDICINE CLINIC | Facility: CLINIC | Age: 71
End: 2021-03-09
Payer: COMMERCIAL

## 2021-03-09 ENCOUNTER — LAB ENCOUNTER (OUTPATIENT)
Dept: LAB | Age: 71
End: 2021-03-09
Attending: FAMILY MEDICINE
Payer: MEDICARE

## 2021-03-09 VITALS
OXYGEN SATURATION: 98 % | HEART RATE: 43 BPM | WEIGHT: 187 LBS | HEIGHT: 72 IN | DIASTOLIC BLOOD PRESSURE: 80 MMHG | BODY MASS INDEX: 25.33 KG/M2 | SYSTOLIC BLOOD PRESSURE: 108 MMHG | RESPIRATION RATE: 16 BRPM

## 2021-03-09 DIAGNOSIS — N52.9 IMPOTENCE OF ORGANIC ORIGIN: ICD-10-CM

## 2021-03-09 DIAGNOSIS — Z12.5 SCREENING FOR PROSTATE CANCER: ICD-10-CM

## 2021-03-09 DIAGNOSIS — Z86.010 HISTORY OF ADENOMATOUS POLYP OF COLON: ICD-10-CM

## 2021-03-09 DIAGNOSIS — N40.1 BENIGN PROSTATIC HYPERPLASIA WITH NOCTURIA: ICD-10-CM

## 2021-03-09 DIAGNOSIS — Z13.6 SCREENING FOR CARDIOVASCULAR CONDITION: ICD-10-CM

## 2021-03-09 DIAGNOSIS — Z91.030 H/O BEE STING ALLERGY: ICD-10-CM

## 2021-03-09 DIAGNOSIS — D69.6 THROMBOCYTOPENIA (HCC): ICD-10-CM

## 2021-03-09 DIAGNOSIS — B00.1 RECURRENT COLD SORES: ICD-10-CM

## 2021-03-09 DIAGNOSIS — Z87.19 H/O BILATERAL INGUINAL HERNIA REPAIR: ICD-10-CM

## 2021-03-09 DIAGNOSIS — N18.31 STAGE 3A CHRONIC KIDNEY DISEASE (HCC): Primary | Chronic | ICD-10-CM

## 2021-03-09 DIAGNOSIS — Z13.0 SCREENING FOR DEFICIENCY ANEMIA: ICD-10-CM

## 2021-03-09 DIAGNOSIS — Z00.00 ENCOUNTER FOR ANNUAL HEALTH EXAMINATION: ICD-10-CM

## 2021-03-09 DIAGNOSIS — Z98.890 H/O BILATERAL INGUINAL HERNIA REPAIR: ICD-10-CM

## 2021-03-09 DIAGNOSIS — R35.1 BENIGN PROSTATIC HYPERPLASIA WITH NOCTURIA: ICD-10-CM

## 2021-03-09 PROBLEM — D12.2 BENIGN NEOPLASM OF ASCENDING COLON: Status: RESOLVED | Noted: 2020-07-06 | Resolved: 2021-03-09

## 2021-03-09 LAB
ALBUMIN SERPL-MCNC: 4.3 G/DL (ref 3.4–5)
ALBUMIN/GLOB SERPL: 1.7 {RATIO} (ref 1–2)
ALP LIVER SERPL-CCNC: 67 U/L
ALT SERPL-CCNC: 16 U/L
ANION GAP SERPL CALC-SCNC: 7 MMOL/L (ref 0–18)
AST SERPL-CCNC: 17 U/L (ref 15–37)
BASOPHILS # BLD AUTO: 0.07 X10(3) UL (ref 0–0.2)
BASOPHILS NFR BLD AUTO: 1.1 %
BILIRUB SERPL-MCNC: 1.2 MG/DL (ref 0.1–2)
BUN BLD-MCNC: 29 MG/DL (ref 7–18)
BUN/CREAT SERPL: 22.3 (ref 10–20)
CALCIUM BLD-MCNC: 9.9 MG/DL (ref 8.5–10.1)
CHLORIDE SERPL-SCNC: 108 MMOL/L (ref 98–112)
CHOLEST SMN-MCNC: 191 MG/DL (ref ?–200)
CO2 SERPL-SCNC: 27 MMOL/L (ref 21–32)
COMPLEXED PSA SERPL-MCNC: 2.05 NG/ML (ref ?–4)
CREAT BLD-MCNC: 1.3 MG/DL
DEPRECATED RDW RBC AUTO: 45.4 FL (ref 35.1–46.3)
EOSINOPHIL # BLD AUTO: 0.05 X10(3) UL (ref 0–0.7)
EOSINOPHIL NFR BLD AUTO: 0.8 %
ERYTHROCYTE [DISTWIDTH] IN BLOOD BY AUTOMATED COUNT: 12.6 % (ref 11–15)
GLOBULIN PLAS-MCNC: 2.6 G/DL (ref 2.8–4.4)
GLUCOSE BLD-MCNC: 89 MG/DL (ref 70–99)
HCT VFR BLD AUTO: 46.2 %
HDLC SERPL-MCNC: 59 MG/DL (ref 40–59)
HGB BLD-MCNC: 15.4 G/DL
IMM GRANULOCYTES # BLD AUTO: 0.02 X10(3) UL (ref 0–1)
IMM GRANULOCYTES NFR BLD: 0.3 %
LDLC SERPL CALC-MCNC: 116 MG/DL (ref ?–100)
LYMPHOCYTES # BLD AUTO: 1.61 X10(3) UL (ref 1–4)
LYMPHOCYTES NFR BLD AUTO: 25.6 %
M PROTEIN MFR SERPL ELPH: 6.9 G/DL (ref 6.4–8.2)
MCH RBC QN AUTO: 32.6 PG (ref 26–34)
MCHC RBC AUTO-ENTMCNC: 33.3 G/DL (ref 31–37)
MCV RBC AUTO: 97.9 FL
MONOCYTES # BLD AUTO: 0.62 X10(3) UL (ref 0.1–1)
MONOCYTES NFR BLD AUTO: 9.9 %
NEUTROPHILS # BLD AUTO: 3.92 X10 (3) UL (ref 1.5–7.7)
NEUTROPHILS # BLD AUTO: 3.92 X10(3) UL (ref 1.5–7.7)
NEUTROPHILS NFR BLD AUTO: 62.3 %
NONHDLC SERPL-MCNC: 132 MG/DL (ref ?–130)
OSMOLALITY SERPL CALC.SUM OF ELEC: 299 MOSM/KG (ref 275–295)
PATIENT FASTING Y/N/NP: YES
PATIENT FASTING Y/N/NP: YES
PLATELET # BLD AUTO: 153 10(3)UL (ref 150–450)
POTASSIUM SERPL-SCNC: 4.1 MMOL/L (ref 3.5–5.1)
RBC # BLD AUTO: 4.72 X10(6)UL
SODIUM SERPL-SCNC: 142 MMOL/L (ref 136–145)
TRIGL SERPL-MCNC: 78 MG/DL (ref 30–149)
VLDLC SERPL CALC-MCNC: 16 MG/DL (ref 0–30)
WBC # BLD AUTO: 6.3 X10(3) UL (ref 4–11)

## 2021-03-09 PROCEDURE — 85025 COMPLETE CBC W/AUTO DIFF WBC: CPT | Performed by: FAMILY MEDICINE

## 2021-03-09 PROCEDURE — 36415 COLL VENOUS BLD VENIPUNCTURE: CPT | Performed by: FAMILY MEDICINE

## 2021-03-09 PROCEDURE — 3079F DIAST BP 80-89 MM HG: CPT | Performed by: FAMILY MEDICINE

## 2021-03-09 PROCEDURE — G0439 PPPS, SUBSEQ VISIT: HCPCS | Performed by: FAMILY MEDICINE

## 2021-03-09 PROCEDURE — 3008F BODY MASS INDEX DOCD: CPT | Performed by: FAMILY MEDICINE

## 2021-03-09 PROCEDURE — 99397 PER PM REEVAL EST PAT 65+ YR: CPT | Performed by: FAMILY MEDICINE

## 2021-03-09 PROCEDURE — 3074F SYST BP LT 130 MM HG: CPT | Performed by: FAMILY MEDICINE

## 2021-03-09 PROCEDURE — 96160 PT-FOCUSED HLTH RISK ASSMT: CPT | Performed by: FAMILY MEDICINE

## 2021-03-09 PROCEDURE — 80053 COMPREHEN METABOLIC PANEL: CPT | Performed by: FAMILY MEDICINE

## 2021-03-09 PROCEDURE — 80061 LIPID PANEL: CPT | Performed by: FAMILY MEDICINE

## 2021-03-09 RX ORDER — FAMCICLOVIR 125 MG/1
125 TABLET, FILM COATED ORAL 2 TIMES DAILY
Qty: 14 TABLET | Refills: 1 | Status: SHIPPED | OUTPATIENT
Start: 2021-03-09 | End: 2021-12-09

## 2021-03-09 RX ORDER — EPINEPHRINE 0.3 MG/.3ML
0.3 INJECTION SUBCUTANEOUS ONCE
Qty: 1 EACH | Refills: 0 | Status: SHIPPED | OUTPATIENT
Start: 2021-03-09 | End: 2021-03-09

## 2021-03-09 RX ORDER — TERAZOSIN 2 MG/1
CAPSULE ORAL
Qty: 90 CAPSULE | Refills: 3 | Status: SHIPPED | OUTPATIENT
Start: 2021-03-09

## 2021-03-09 NOTE — PROGRESS NOTES
HPI:   Qi Macias is a 70year old male who presents for a MA (Medicare Advantage) [facility] (Once per calendar year). Has something behind his ear, but may have resolved. Just saw dermatology.     Plays tennis 5 times a week, and walks 25 m with nocturia     H/O bilateral inguinal hernia repair     CKD (chronic kidney disease) stage 3, GFR 30-59 ml/min     History of adenomatous polyp of colon     Thrombocytopenia (HCC)    Wt Readings from Last 3 Encounters:  03/09/21 : 187 lb (84.8 kg)  07/0 Estimated body mass index is 25.36 kg/m² as calculated from the following:    Height as of this encounter: 6' (1.829 m). Weight as of this encounter: 187 lb (84.8 kg).     Medicare Hearing Assessment  (Required for AWV/SWV)    Passed finger rub test bila Dose 65 yr and older (39230) 10/19/2017, 09/19/2019   • Pneumococcal (Prevnar 13) 01/07/2016   • Pneumovax 23 11/18/2010   • TD 01/09/2006   • TDAP 07/19/2017   • Zoster Vaccine Live (Zostavax) 09/29/2011   • Zoster Vaccine Recombinant Adjuvanted (Shingrix A1C    No flowsheet data found.     Fasting Blood Sugar (FSB)Annually Glucose (mg/dL)   Date Value   03/04/2020 91       Cardiovascular Disease Screening     LDL Annually LDL Cholesterol (mg/dL)   Date Value   03/04/2020 102 (H)        EKG - w/ Initial Prev needed    Zoster   Not covered by Medicare Part B No vaccine history found This may be covered with your pharmacy  prescription benefits

## 2021-03-09 NOTE — PATIENT INSTRUCTIONS
Hannah Bach's SCREENING SCHEDULE   Tests on this list are recommended by your physician but may not be covered, or covered at this frequency, by your insurer. Please check with your insurance carrier before scheduling to verify coverage.     PREVENT if abnormal Colonoscopy due on 07/06/2025 Update [facility] if applicable    Flex Sigmoidoscopy Screen  Covered every 5 years No results found for this or any previous visit. No flowsheet data found.      Fecal Occult Blood   Covered Annually Occult Medicare Part B) Orders placed or performed in visit on 07/18/17   • TETANUS, DIPHTHERIA TOXOIDS AND ACELLULAR PERTUSIS VACCINE (TDAP), >7 YEARS, IM USE    This may be covered with your prescription benefits, but Medicare does not cover unless Medically ne

## 2021-04-07 ENCOUNTER — HOSPITAL ENCOUNTER (EMERGENCY)
Facility: HOSPITAL | Age: 71
Discharge: HOME | End: 2021-04-07
Attending: EMERGENCY MEDICINE
Payer: MEDICARE

## 2021-04-07 ENCOUNTER — APPOINTMENT (OUTPATIENT)
Dept: GENERAL RADIOLOGY | Facility: HOSPITAL | Age: 71
End: 2021-04-07
Attending: EMERGENCY MEDICINE
Payer: MEDICARE

## 2021-04-07 VITALS
DIASTOLIC BLOOD PRESSURE: 87 MMHG | RESPIRATION RATE: 15 BRPM | OXYGEN SATURATION: 98 % | HEART RATE: 37 BPM | SYSTOLIC BLOOD PRESSURE: 157 MMHG | WEIGHT: 186 LBS | TEMPERATURE: 98 F | HEIGHT: 72 IN | BODY MASS INDEX: 25.19 KG/M2

## 2021-04-07 DIAGNOSIS — S69.91XA HAND INJURY, RIGHT, INITIAL ENCOUNTER: Primary | ICD-10-CM

## 2021-04-07 PROCEDURE — 99283 EMERGENCY DEPT VISIT LOW MDM: CPT

## 2021-04-07 PROCEDURE — 73130 X-RAY EXAM OF HAND: CPT | Performed by: EMERGENCY MEDICINE

## 2021-04-07 NOTE — ED INITIAL ASSESSMENT (HPI)
PT was digging with garden spade, hitting the handle with the palm of R hand. PT reports immediate color change and pain to the area. CMS intact.

## 2021-04-07 NOTE — ED PROVIDER NOTES
Patient Seen in: [facility] Emergency Department      History   Patient presents with:  Pain    Stated Complaint: reports he was digging with shovel and felt sharp pain to right hand, now havin*    HPI/Subjective:   HPI    Patient is a 66-year-old m [04/07/21 1342]   BP (!) 172/83   Pulse (!) 45   Resp 17   Temp 98.2 °F (36.8 °C)   Temp src Oral   SpO2 96 %   O2 Device None (Room air)       Current:/63   Pulse (!) 38   Temp 98.2 °F (36.8 °C) (Oral)   Resp 18   Ht 182.9 cm (6')   Wt 84.4 kg   SpO 4/07/2021 at 4:16 PM              MDM      66-year-old male presenting with right palm pain after he was gardening and hit a root with a spade. He reported immediate bruising and some swelling there although that has mostly resolved now.  X-rays negative fo

## 2021-12-09 DIAGNOSIS — B00.1 RECURRENT COLD SORES: ICD-10-CM

## 2021-12-09 RX ORDER — FAMCICLOVIR 125 MG/1
TABLET, FILM COATED ORAL
Qty: 14 TABLET | Refills: 0 | Status: SHIPPED | OUTPATIENT
Start: 2021-12-09

## 2022-03-15 ENCOUNTER — OFFICE VISIT (OUTPATIENT)
Dept: FAMILY MEDICINE CLINIC | Facility: CLINIC | Age: 72
End: 2022-03-15
Payer: COMMERCIAL

## 2022-03-15 ENCOUNTER — LAB ENCOUNTER (OUTPATIENT)
Dept: LAB | Age: 72
End: 2022-03-15
Attending: FAMILY MEDICINE
Payer: MEDICARE

## 2022-03-15 VITALS
HEART RATE: 38 BPM | RESPIRATION RATE: 16 BRPM | OXYGEN SATURATION: 98 % | WEIGHT: 193 LBS | HEIGHT: 72 IN | BODY MASS INDEX: 26.14 KG/M2 | SYSTOLIC BLOOD PRESSURE: 128 MMHG | DIASTOLIC BLOOD PRESSURE: 64 MMHG

## 2022-03-15 DIAGNOSIS — N52.9 IMPOTENCE OF ORGANIC ORIGIN: ICD-10-CM

## 2022-03-15 DIAGNOSIS — Z87.19 H/O BILATERAL INGUINAL HERNIA REPAIR: ICD-10-CM

## 2022-03-15 DIAGNOSIS — N18.31 STAGE 3A CHRONIC KIDNEY DISEASE (HCC): ICD-10-CM

## 2022-03-15 DIAGNOSIS — Z13.6 SCREENING FOR CARDIOVASCULAR CONDITION: ICD-10-CM

## 2022-03-15 DIAGNOSIS — Z00.00 ROUTINE GENERAL MEDICAL EXAMINATION AT A HEALTH CARE FACILITY: Primary | ICD-10-CM

## 2022-03-15 DIAGNOSIS — Z12.5 SCREENING FOR PROSTATE CANCER: ICD-10-CM

## 2022-03-15 DIAGNOSIS — R35.1 BENIGN PROSTATIC HYPERPLASIA WITH NOCTURIA: ICD-10-CM

## 2022-03-15 DIAGNOSIS — Z00.00 ENCOUNTER FOR ANNUAL HEALTH EXAMINATION: Primary | ICD-10-CM

## 2022-03-15 DIAGNOSIS — Z98.890 H/O BILATERAL INGUINAL HERNIA REPAIR: ICD-10-CM

## 2022-03-15 DIAGNOSIS — Z23 NEED FOR PNEUMOCOCCAL VACCINATION: ICD-10-CM

## 2022-03-15 DIAGNOSIS — N40.1 BENIGN PROSTATIC HYPERPLASIA WITH NOCTURIA: ICD-10-CM

## 2022-03-15 DIAGNOSIS — D69.6 THROMBOCYTOPENIA (HCC): ICD-10-CM

## 2022-03-15 DIAGNOSIS — Z86.010 HISTORY OF ADENOMATOUS POLYP OF COLON: ICD-10-CM

## 2022-03-15 LAB
ALBUMIN SERPL-MCNC: 4.1 G/DL (ref 3.4–5)
ALBUMIN/GLOB SERPL: 1.7 {RATIO} (ref 1–2)
ALP LIVER SERPL-CCNC: 64 U/L
ANION GAP SERPL CALC-SCNC: 5 MMOL/L (ref 0–18)
AST SERPL-CCNC: 24 U/L (ref 15–37)
BASOPHILS # BLD AUTO: 0.07 X10(3) UL (ref 0–0.2)
BASOPHILS NFR BLD AUTO: 1.2 %
BILIRUB SERPL-MCNC: 0.7 MG/DL (ref 0.1–2)
BUN BLD-MCNC: 22 MG/DL (ref 7–18)
CALCIUM BLD-MCNC: 9.7 MG/DL (ref 8.5–10.1)
CHLORIDE SERPL-SCNC: 107 MMOL/L (ref 98–112)
CHOLEST SERPL-MCNC: 184 MG/DL (ref ?–200)
CO2 SERPL-SCNC: 29 MMOL/L (ref 21–32)
COMPLEXED PSA SERPL-MCNC: 1.86 NG/ML (ref ?–4)
CREAT BLD-MCNC: 1.22 MG/DL
EOSINOPHIL # BLD AUTO: 0.07 X10(3) UL (ref 0–0.7)
EOSINOPHIL NFR BLD AUTO: 1.2 %
ERYTHROCYTE [DISTWIDTH] IN BLOOD BY AUTOMATED COUNT: 12.7 %
FASTING PATIENT LIPID ANSWER: YES
FASTING STATUS PATIENT QL REPORTED: YES
GLOBULIN PLAS-MCNC: 2.4 G/DL (ref 2.8–4.4)
GLUCOSE BLD-MCNC: 81 MG/DL (ref 70–99)
HCT VFR BLD AUTO: 43.9 %
HDLC SERPL-MCNC: 50 MG/DL (ref 40–59)
HGB BLD-MCNC: 14.5 G/DL
IMM GRANULOCYTES # BLD AUTO: 0.03 X10(3) UL (ref 0–1)
IMM GRANULOCYTES NFR BLD: 0.5 %
LDLC SERPL CALC-MCNC: 120 MG/DL (ref ?–100)
LYMPHOCYTES # BLD AUTO: 1.59 X10(3) UL (ref 1–4)
LYMPHOCYTES NFR BLD AUTO: 26.7 %
MCH RBC QN AUTO: 31.7 PG (ref 26–34)
MCHC RBC AUTO-ENTMCNC: 33 G/DL (ref 31–37)
MCV RBC AUTO: 95.9 FL
MONOCYTES # BLD AUTO: 0.53 X10(3) UL (ref 0.1–1)
MONOCYTES NFR BLD AUTO: 8.9 %
NEUTROPHILS # BLD AUTO: 3.67 X10 (3) UL (ref 1.5–7.7)
NEUTROPHILS # BLD AUTO: 3.67 X10(3) UL (ref 1.5–7.7)
NEUTROPHILS NFR BLD AUTO: 61.5 %
NONHDLC SERPL-MCNC: 134 MG/DL (ref ?–130)
OSMOLALITY SERPL CALC.SUM OF ELEC: 294 MOSM/KG (ref 275–295)
PLATELET # BLD AUTO: 171 10(3)UL (ref 150–450)
POTASSIUM SERPL-SCNC: 4.4 MMOL/L (ref 3.5–5.1)
PROT SERPL-MCNC: 6.5 G/DL (ref 6.4–8.2)
RBC # BLD AUTO: 4.58 X10(6)UL
SODIUM SERPL-SCNC: 141 MMOL/L (ref 136–145)
TRIGL SERPL-MCNC: 76 MG/DL (ref 30–149)
VLDLC SERPL CALC-MCNC: 13 MG/DL (ref 0–30)
WBC # BLD AUTO: 6 X10(3) UL (ref 4–11)

## 2022-03-15 PROCEDURE — 80053 COMPREHEN METABOLIC PANEL: CPT | Performed by: FAMILY MEDICINE

## 2022-03-15 PROCEDURE — 85025 COMPLETE CBC W/AUTO DIFF WBC: CPT | Performed by: FAMILY MEDICINE

## 2022-03-15 PROCEDURE — 90732 PPSV23 VACC 2 YRS+ SUBQ/IM: CPT | Performed by: FAMILY MEDICINE

## 2022-03-15 PROCEDURE — 36415 COLL VENOUS BLD VENIPUNCTURE: CPT

## 2022-03-15 PROCEDURE — 3008F BODY MASS INDEX DOCD: CPT | Performed by: FAMILY MEDICINE

## 2022-03-15 PROCEDURE — 99397 PER PM REEVAL EST PAT 65+ YR: CPT | Performed by: FAMILY MEDICINE

## 2022-03-15 PROCEDURE — G0009 ADMIN PNEUMOCOCCAL VACCINE: HCPCS | Performed by: FAMILY MEDICINE

## 2022-03-15 PROCEDURE — 3078F DIAST BP <80 MM HG: CPT | Performed by: FAMILY MEDICINE

## 2022-03-15 PROCEDURE — 80061 LIPID PANEL: CPT

## 2022-03-15 PROCEDURE — G0439 PPPS, SUBSEQ VISIT: HCPCS | Performed by: FAMILY MEDICINE

## 2022-03-15 PROCEDURE — 3074F SYST BP LT 130 MM HG: CPT | Performed by: FAMILY MEDICINE

## 2022-03-15 PROCEDURE — 96160 PT-FOCUSED HLTH RISK ASSMT: CPT | Performed by: FAMILY MEDICINE

## 2022-05-22 DIAGNOSIS — B00.1 RECURRENT COLD SORES: ICD-10-CM

## 2022-05-23 RX ORDER — FAMCICLOVIR 125 MG/1
TABLET, FILM COATED ORAL
Qty: 14 TABLET | Refills: 0 | Status: SHIPPED | OUTPATIENT
Start: 2022-05-23

## 2022-06-03 DIAGNOSIS — R35.1 BENIGN PROSTATIC HYPERPLASIA WITH NOCTURIA: ICD-10-CM

## 2022-06-03 DIAGNOSIS — N40.1 BENIGN PROSTATIC HYPERPLASIA WITH NOCTURIA: ICD-10-CM

## 2022-06-03 RX ORDER — TERAZOSIN 2 MG/1
CAPSULE ORAL
Qty: 90 CAPSULE | Refills: 0 | Status: SHIPPED | OUTPATIENT
Start: 2022-06-03

## 2022-08-04 DIAGNOSIS — B00.1 RECURRENT COLD SORES: ICD-10-CM

## 2022-08-04 RX ORDER — FAMCICLOVIR 125 MG/1
TABLET, FILM COATED ORAL
Qty: 14 TABLET | Refills: 0 | Status: SHIPPED | OUTPATIENT
Start: 2022-08-04

## 2022-09-01 DIAGNOSIS — R35.1 BENIGN PROSTATIC HYPERPLASIA WITH NOCTURIA: ICD-10-CM

## 2022-09-01 DIAGNOSIS — N40.1 BENIGN PROSTATIC HYPERPLASIA WITH NOCTURIA: ICD-10-CM

## 2022-09-02 RX ORDER — TERAZOSIN 2 MG/1
CAPSULE ORAL
Qty: 90 CAPSULE | Refills: 0 | Status: SHIPPED | OUTPATIENT
Start: 2022-09-02

## 2022-11-28 DIAGNOSIS — N40.1 BENIGN PROSTATIC HYPERPLASIA WITH NOCTURIA: ICD-10-CM

## 2022-11-28 DIAGNOSIS — R35.1 BENIGN PROSTATIC HYPERPLASIA WITH NOCTURIA: ICD-10-CM

## 2022-11-28 RX ORDER — TERAZOSIN 2 MG/1
CAPSULE ORAL
Qty: 90 CAPSULE | Refills: 0 | Status: SHIPPED | OUTPATIENT
Start: 2022-11-28

## 2022-12-02 DIAGNOSIS — B00.1 RECURRENT COLD SORES: ICD-10-CM

## 2022-12-02 RX ORDER — FAMCICLOVIR 125 MG/1
TABLET, FILM COATED ORAL
Qty: 14 TABLET | Refills: 0 | Status: SHIPPED | OUTPATIENT
Start: 2022-12-02

## 2023-01-17 ENCOUNTER — OFFICE VISIT (OUTPATIENT)
Dept: FAMILY MEDICINE CLINIC | Facility: CLINIC | Age: 73
End: 2023-01-17
Payer: MEDICARE

## 2023-01-17 ENCOUNTER — LAB ENCOUNTER (OUTPATIENT)
Dept: LAB | Age: 73
End: 2023-01-17
Attending: FAMILY MEDICINE
Payer: MEDICARE

## 2023-01-17 VITALS
HEIGHT: 72 IN | WEIGHT: 193.81 LBS | SYSTOLIC BLOOD PRESSURE: 139 MMHG | DIASTOLIC BLOOD PRESSURE: 71 MMHG | HEART RATE: 35 BPM | BODY MASS INDEX: 26.25 KG/M2 | RESPIRATION RATE: 18 BRPM | OXYGEN SATURATION: 98 %

## 2023-01-17 DIAGNOSIS — R35.1 BENIGN PROSTATIC HYPERPLASIA WITH NOCTURIA: ICD-10-CM

## 2023-01-17 DIAGNOSIS — Z12.5 SCREENING FOR PROSTATE CANCER: ICD-10-CM

## 2023-01-17 DIAGNOSIS — Z86.010 HISTORY OF ADENOMATOUS POLYP OF COLON: ICD-10-CM

## 2023-01-17 DIAGNOSIS — N40.1 BENIGN PROSTATIC HYPERPLASIA WITH NOCTURIA: ICD-10-CM

## 2023-01-17 DIAGNOSIS — N52.9 IMPOTENCE OF ORGANIC ORIGIN: ICD-10-CM

## 2023-01-17 DIAGNOSIS — Z87.19 H/O BILATERAL INGUINAL HERNIA REPAIR: ICD-10-CM

## 2023-01-17 DIAGNOSIS — D69.6 THROMBOCYTOPENIA (HCC): ICD-10-CM

## 2023-01-17 DIAGNOSIS — N18.31 STAGE 3A CHRONIC KIDNEY DISEASE (HCC): ICD-10-CM

## 2023-01-17 DIAGNOSIS — Z13.6 SCREENING FOR CARDIOVASCULAR CONDITION: ICD-10-CM

## 2023-01-17 DIAGNOSIS — Z00.00 ENCOUNTER FOR ANNUAL HEALTH EXAMINATION: Primary | ICD-10-CM

## 2023-01-17 DIAGNOSIS — Z98.890 H/O BILATERAL INGUINAL HERNIA REPAIR: ICD-10-CM

## 2023-01-17 LAB
ALBUMIN SERPL-MCNC: 4.1 G/DL (ref 3.4–5)
ALBUMIN/GLOB SERPL: 1.5 {RATIO} (ref 1–2)
ALP LIVER SERPL-CCNC: 72 U/L
ALT SERPL-CCNC: 14 U/L
ANION GAP SERPL CALC-SCNC: 4 MMOL/L (ref 0–18)
AST SERPL-CCNC: 22 U/L (ref 15–37)
BASOPHILS # BLD AUTO: 0.06 X10(3) UL (ref 0–0.2)
BASOPHILS NFR BLD AUTO: 1 %
BILIRUB SERPL-MCNC: 1 MG/DL (ref 0.1–2)
BUN BLD-MCNC: 25 MG/DL (ref 7–18)
CALCIUM BLD-MCNC: 9.7 MG/DL (ref 8.5–10.1)
CHLORIDE SERPL-SCNC: 108 MMOL/L (ref 98–112)
CHOLEST SERPL-MCNC: 197 MG/DL (ref ?–200)
CO2 SERPL-SCNC: 28 MMOL/L (ref 21–32)
COMPLEXED PSA SERPL-MCNC: 2.53 NG/ML (ref ?–4)
CREAT BLD-MCNC: 1.19 MG/DL
EOSINOPHIL # BLD AUTO: 0.07 X10(3) UL (ref 0–0.7)
EOSINOPHIL NFR BLD AUTO: 1.2 %
ERYTHROCYTE [DISTWIDTH] IN BLOOD BY AUTOMATED COUNT: 12.4 %
FASTING PATIENT LIPID ANSWER: YES
FASTING STATUS PATIENT QL REPORTED: YES
GFR SERPLBLD BASED ON 1.73 SQ M-ARVRAT: 65 ML/MIN/1.73M2 (ref 60–?)
GLOBULIN PLAS-MCNC: 2.7 G/DL (ref 2.8–4.4)
GLUCOSE BLD-MCNC: 95 MG/DL (ref 70–99)
HCT VFR BLD AUTO: 44.6 %
HDLC SERPL-MCNC: 58 MG/DL (ref 40–59)
HGB BLD-MCNC: 14.9 G/DL
IMM GRANULOCYTES # BLD AUTO: 0.02 X10(3) UL (ref 0–1)
IMM GRANULOCYTES NFR BLD: 0.3 %
LDLC SERPL CALC-MCNC: 126 MG/DL (ref ?–100)
LYMPHOCYTES # BLD AUTO: 1.42 X10(3) UL (ref 1–4)
LYMPHOCYTES NFR BLD AUTO: 23.9 %
MCH RBC QN AUTO: 32.7 PG (ref 26–34)
MCHC RBC AUTO-ENTMCNC: 33.4 G/DL (ref 31–37)
MCV RBC AUTO: 98 FL
MONOCYTES # BLD AUTO: 0.55 X10(3) UL (ref 0.1–1)
MONOCYTES NFR BLD AUTO: 9.3 %
NEUTROPHILS # BLD AUTO: 3.81 X10 (3) UL (ref 1.5–7.7)
NEUTROPHILS # BLD AUTO: 3.81 X10(3) UL (ref 1.5–7.7)
NEUTROPHILS NFR BLD AUTO: 64.3 %
NONHDLC SERPL-MCNC: 139 MG/DL (ref ?–130)
OSMOLALITY SERPL CALC.SUM OF ELEC: 294 MOSM/KG (ref 275–295)
PLATELET # BLD AUTO: 163 10(3)UL (ref 150–450)
POTASSIUM SERPL-SCNC: 4.1 MMOL/L (ref 3.5–5.1)
PROT SERPL-MCNC: 6.8 G/DL (ref 6.4–8.2)
RBC # BLD AUTO: 4.55 X10(6)UL
SODIUM SERPL-SCNC: 140 MMOL/L (ref 136–145)
TRIGL SERPL-MCNC: 72 MG/DL (ref 30–149)
VLDLC SERPL CALC-MCNC: 13 MG/DL (ref 0–30)
WBC # BLD AUTO: 5.9 X10(3) UL (ref 4–11)

## 2023-01-17 PROCEDURE — 3008F BODY MASS INDEX DOCD: CPT | Performed by: FAMILY MEDICINE

## 2023-01-17 PROCEDURE — 80061 LIPID PANEL: CPT | Performed by: FAMILY MEDICINE

## 2023-01-17 PROCEDURE — 99397 PER PM REEVAL EST PAT 65+ YR: CPT | Performed by: FAMILY MEDICINE

## 2023-01-17 PROCEDURE — G0439 PPPS, SUBSEQ VISIT: HCPCS | Performed by: FAMILY MEDICINE

## 2023-01-17 PROCEDURE — 36415 COLL VENOUS BLD VENIPUNCTURE: CPT | Performed by: FAMILY MEDICINE

## 2023-01-17 PROCEDURE — 96160 PT-FOCUSED HLTH RISK ASSMT: CPT | Performed by: FAMILY MEDICINE

## 2023-01-17 PROCEDURE — 80053 COMPREHEN METABOLIC PANEL: CPT | Performed by: FAMILY MEDICINE

## 2023-01-17 PROCEDURE — 3075F SYST BP GE 130 - 139MM HG: CPT | Performed by: FAMILY MEDICINE

## 2023-01-17 PROCEDURE — 3078F DIAST BP <80 MM HG: CPT | Performed by: FAMILY MEDICINE

## 2023-01-17 PROCEDURE — 1126F AMNT PAIN NOTED NONE PRSNT: CPT | Performed by: FAMILY MEDICINE

## 2023-01-17 PROCEDURE — 85025 COMPLETE CBC W/AUTO DIFF WBC: CPT | Performed by: FAMILY MEDICINE

## 2023-03-06 DIAGNOSIS — R35.1 BENIGN PROSTATIC HYPERPLASIA WITH NOCTURIA: ICD-10-CM

## 2023-03-06 DIAGNOSIS — N40.1 BENIGN PROSTATIC HYPERPLASIA WITH NOCTURIA: ICD-10-CM

## 2023-03-06 DIAGNOSIS — B00.1 RECURRENT COLD SORES: ICD-10-CM

## 2023-03-06 RX ORDER — TERAZOSIN 2 MG/1
CAPSULE ORAL
Qty: 90 CAPSULE | Refills: 0 | Status: SHIPPED | OUTPATIENT
Start: 2023-03-06

## 2023-03-06 RX ORDER — FAMCICLOVIR 125 MG/1
TABLET ORAL
Qty: 14 TABLET | Refills: 0 | Status: SHIPPED | OUTPATIENT
Start: 2023-03-06

## 2023-05-31 DIAGNOSIS — R35.1 BENIGN PROSTATIC HYPERPLASIA WITH NOCTURIA: ICD-10-CM

## 2023-05-31 DIAGNOSIS — N40.1 BENIGN PROSTATIC HYPERPLASIA WITH NOCTURIA: ICD-10-CM

## 2023-05-31 RX ORDER — TERAZOSIN 2 MG/1
CAPSULE ORAL
Qty: 90 CAPSULE | Refills: 0 | Status: SHIPPED | OUTPATIENT
Start: 2023-05-31

## 2023-08-26 DIAGNOSIS — R35.1 BENIGN PROSTATIC HYPERPLASIA WITH NOCTURIA: ICD-10-CM

## 2023-08-26 DIAGNOSIS — N40.1 BENIGN PROSTATIC HYPERPLASIA WITH NOCTURIA: ICD-10-CM

## 2023-08-26 DIAGNOSIS — B00.1 RECURRENT COLD SORES: ICD-10-CM

## 2023-08-28 RX ORDER — TERAZOSIN 2 MG/1
CAPSULE ORAL
Qty: 90 CAPSULE | Refills: 0 | Status: SHIPPED | OUTPATIENT
Start: 2023-08-28

## 2023-08-28 RX ORDER — FAMCICLOVIR 125 MG/1
125 TABLET ORAL 2 TIMES DAILY
Qty: 14 TABLET | Refills: 0 | Status: SHIPPED | OUTPATIENT
Start: 2023-08-28

## 2023-11-28 DIAGNOSIS — R35.1 BENIGN PROSTATIC HYPERPLASIA WITH NOCTURIA: ICD-10-CM

## 2023-11-28 DIAGNOSIS — N40.1 BENIGN PROSTATIC HYPERPLASIA WITH NOCTURIA: ICD-10-CM

## 2023-11-28 RX ORDER — TERAZOSIN 2 MG/1
CAPSULE ORAL
Qty: 90 CAPSULE | Refills: 0 | Status: SHIPPED | OUTPATIENT
Start: 2023-11-28

## 2023-11-28 NOTE — TELEPHONE ENCOUNTER
.A refill request was received for:  Requested Prescriptions     Pending Prescriptions Disp Refills    TERAZOSIN 2 MG Oral Cap [Pharmacy Med Name: Terazosin HCl Oral Capsule 2 MG] 90 capsule 0     Sig: TAKE ONE CAPSULE BY MOUTH NIGHTLY       Last refill date:   8/28/2023    Last office visit: 1/17/2023    Follow up due:  No future appointments.

## 2024-02-13 ENCOUNTER — LAB ENCOUNTER (OUTPATIENT)
Dept: LAB | Age: 74
End: 2024-02-13
Attending: FAMILY MEDICINE
Payer: MEDICARE

## 2024-02-13 ENCOUNTER — OFFICE VISIT (OUTPATIENT)
Dept: FAMILY MEDICINE CLINIC | Facility: CLINIC | Age: 74
End: 2024-02-13
Payer: MEDICARE

## 2024-02-13 VITALS
SYSTOLIC BLOOD PRESSURE: 128 MMHG | DIASTOLIC BLOOD PRESSURE: 74 MMHG | HEART RATE: 84 BPM | BODY MASS INDEX: 26.28 KG/M2 | WEIGHT: 194 LBS | OXYGEN SATURATION: 98 % | RESPIRATION RATE: 16 BRPM | HEIGHT: 72 IN

## 2024-02-13 DIAGNOSIS — N18.31 STAGE 3A CHRONIC KIDNEY DISEASE (HCC): Chronic | ICD-10-CM

## 2024-02-13 DIAGNOSIS — N52.9 IMPOTENCE OF ORGANIC ORIGIN: ICD-10-CM

## 2024-02-13 DIAGNOSIS — Z87.19 H/O BILATERAL INGUINAL HERNIA REPAIR: ICD-10-CM

## 2024-02-13 DIAGNOSIS — Z13.6 SCREENING FOR CARDIOVASCULAR CONDITION: ICD-10-CM

## 2024-02-13 DIAGNOSIS — Z13.0 SCREENING FOR DEFICIENCY ANEMIA: ICD-10-CM

## 2024-02-13 DIAGNOSIS — Z12.5 SCREENING FOR PROSTATE CANCER: ICD-10-CM

## 2024-02-13 DIAGNOSIS — Z00.00 ENCOUNTER FOR ANNUAL HEALTH EXAMINATION: ICD-10-CM

## 2024-02-13 DIAGNOSIS — B00.1 RECURRENT COLD SORES: ICD-10-CM

## 2024-02-13 DIAGNOSIS — Z98.890 H/O BILATERAL INGUINAL HERNIA REPAIR: ICD-10-CM

## 2024-02-13 DIAGNOSIS — N40.1 BENIGN PROSTATIC HYPERPLASIA WITH NOCTURIA: ICD-10-CM

## 2024-02-13 DIAGNOSIS — D69.6 THROMBOCYTOPENIA (HCC): Chronic | ICD-10-CM

## 2024-02-13 DIAGNOSIS — Z00.00 ENCOUNTER FOR ANNUAL HEALTH EXAMINATION: Primary | ICD-10-CM

## 2024-02-13 DIAGNOSIS — Z86.010 HISTORY OF ADENOMATOUS POLYP OF COLON: ICD-10-CM

## 2024-02-13 DIAGNOSIS — R35.1 BENIGN PROSTATIC HYPERPLASIA WITH NOCTURIA: ICD-10-CM

## 2024-02-13 LAB
ALBUMIN SERPL-MCNC: 4.3 G/DL (ref 3.4–5)
ALBUMIN/GLOB SERPL: 1.9 {RATIO} (ref 1–2)
ALP LIVER SERPL-CCNC: 62 U/L
ALT SERPL-CCNC: 14 U/L
ANION GAP SERPL CALC-SCNC: 1 MMOL/L (ref 0–18)
AST SERPL-CCNC: 15 U/L (ref 15–37)
BASOPHILS # BLD AUTO: 0.05 X10(3) UL (ref 0–0.2)
BASOPHILS NFR BLD AUTO: 1 %
BILIRUB SERPL-MCNC: 1 MG/DL (ref 0.1–2)
BUN BLD-MCNC: 32 MG/DL (ref 9–23)
CALCIUM BLD-MCNC: 8.7 MG/DL (ref 8.5–10.1)
CHLORIDE SERPL-SCNC: 115 MMOL/L (ref 98–112)
CHOLEST SERPL-MCNC: 183 MG/DL (ref ?–200)
CO2 SERPL-SCNC: 28 MMOL/L (ref 21–32)
COMPLEXED PSA SERPL-MCNC: 2.13 NG/ML (ref ?–4)
CREAT BLD-MCNC: 1.44 MG/DL
EGFRCR SERPLBLD CKD-EPI 2021: 51 ML/MIN/1.73M2 (ref 60–?)
EOSINOPHIL # BLD AUTO: 0.04 X10(3) UL (ref 0–0.7)
EOSINOPHIL NFR BLD AUTO: 0.8 %
ERYTHROCYTE [DISTWIDTH] IN BLOOD BY AUTOMATED COUNT: 12.8 %
FASTING PATIENT LIPID ANSWER: YES
FASTING STATUS PATIENT QL REPORTED: YES
GLOBULIN PLAS-MCNC: 2.3 G/DL (ref 2.8–4.4)
GLUCOSE BLD-MCNC: 96 MG/DL (ref 70–99)
HCT VFR BLD AUTO: 45.4 %
HDLC SERPL-MCNC: 53 MG/DL (ref 40–59)
HGB BLD-MCNC: 14.6 G/DL
IMM GRANULOCYTES # BLD AUTO: 0.02 X10(3) UL (ref 0–1)
IMM GRANULOCYTES NFR BLD: 0.4 %
LDLC SERPL CALC-MCNC: 114 MG/DL (ref ?–100)
LYMPHOCYTES # BLD AUTO: 1.28 X10(3) UL (ref 1–4)
LYMPHOCYTES NFR BLD AUTO: 24.8 %
MCH RBC QN AUTO: 31.8 PG (ref 26–34)
MCHC RBC AUTO-ENTMCNC: 32.2 G/DL (ref 31–37)
MCV RBC AUTO: 98.9 FL
MONOCYTES # BLD AUTO: 0.47 X10(3) UL (ref 0.1–1)
MONOCYTES NFR BLD AUTO: 9.1 %
NEUTROPHILS # BLD AUTO: 3.3 X10 (3) UL (ref 1.5–7.7)
NEUTROPHILS # BLD AUTO: 3.3 X10(3) UL (ref 1.5–7.7)
NEUTROPHILS NFR BLD AUTO: 63.9 %
NONHDLC SERPL-MCNC: 130 MG/DL (ref ?–130)
OSMOLALITY SERPL CALC.SUM OF ELEC: 305 MOSM/KG (ref 275–295)
PLATELET # BLD AUTO: 164 10(3)UL (ref 150–450)
POTASSIUM SERPL-SCNC: 4.2 MMOL/L (ref 3.5–5.1)
PROT SERPL-MCNC: 6.6 G/DL (ref 6.4–8.2)
RBC # BLD AUTO: 4.59 X10(6)UL
SODIUM SERPL-SCNC: 144 MMOL/L (ref 136–145)
TRIGL SERPL-MCNC: 85 MG/DL (ref 30–149)
VLDLC SERPL CALC-MCNC: 15 MG/DL (ref 0–30)
WBC # BLD AUTO: 5.2 X10(3) UL (ref 4–11)

## 2024-02-13 PROCEDURE — 85025 COMPLETE CBC W/AUTO DIFF WBC: CPT | Performed by: FAMILY MEDICINE

## 2024-02-13 PROCEDURE — 80061 LIPID PANEL: CPT | Performed by: FAMILY MEDICINE

## 2024-02-13 PROCEDURE — 36415 COLL VENOUS BLD VENIPUNCTURE: CPT | Performed by: FAMILY MEDICINE

## 2024-02-13 PROCEDURE — 80053 COMPREHEN METABOLIC PANEL: CPT | Performed by: FAMILY MEDICINE

## 2024-02-13 PROCEDURE — 96160 PT-FOCUSED HLTH RISK ASSMT: CPT | Performed by: FAMILY MEDICINE

## 2024-02-13 PROCEDURE — G0439 PPPS, SUBSEQ VISIT: HCPCS | Performed by: FAMILY MEDICINE

## 2024-02-13 RX ORDER — FAMCICLOVIR 125 MG/1
125 TABLET ORAL 2 TIMES DAILY
Qty: 14 TABLET | Refills: 0 | Status: SHIPPED | OUTPATIENT
Start: 2024-02-13

## 2024-02-13 RX ORDER — TERAZOSIN 5 MG/1
5 CAPSULE ORAL NIGHTLY
Qty: 30 CAPSULE | Refills: 1 | Status: SHIPPED | OUTPATIENT
Start: 2024-02-13

## 2024-02-13 NOTE — PATIENT INSTRUCTIONS
Eriberto Bach's SCREENING SCHEDULE   Tests on this list are recommended by your physician but may not be covered, or covered at this frequency, by your insurer.   Please check with your insurance carrier before scheduling to verify coverage.   PREVENTATIVE SERVICES FREQUENCY &  COVERAGE DETAILS LAST COMPLETION DATE   Diabetes Screening    Fasting Blood Sugar / Glucose    One screening every 12 months if never tested or if previously tested but not diagnosed with pre-diabetes   One screening every 6 months if diagnosed with pre-diabetes Lab Results   Component Value Date    GLU 95 01/17/2023        Cardiovascular Disease Screening    Lipid Panel  Cholesterol  Lipoprotein (HDL)  Triglycerides Covered every 5 years for all Medicare beneficiaries without apparent signs or symptoms of cardiovascular disease Lab Results   Component Value Date    CHOLEST 197 01/17/2023    HDL 58 01/17/2023     (H) 01/17/2023    TRIG 72 01/17/2023         Electrocardiogram (EKG)   Covered if needed at Welcome to Medicare, and non-screening if indicated for medical reasons 05/28/2019      Ultrasound Screening for Abdominal Aortic Aneurysm (AAA) Covered once in a lifetime for one of the following risk factors   • Men who are 65-75 years old and have ever smoked   • Anyone with a family history -     Colorectal Cancer Screening  Covered for ages 50-85; only need ONE of the following:    Colonoscopy   Covered every 10 years    Covered every 2 years if patient is at high risk or previous colonoscopy was abnormal 07/06/2020    Health Maintenance   Topic Date Due   • Colorectal Cancer Screening  07/06/2025       Flexible Sigmoidoscopy   Covered every 4 years -    Fecal Occult Blood Test Covered annually -   Prostate Cancer Screening    Prostate-Specific Antigen (PSA) Annually Lab Results   Component Value Date    PSA 1.920 01/13/2016     Health Maintenance   Topic Date Due   • PSA  01/17/2025      Immunizations    Influenza Covered once  per flu season  Please get every year -  No recommendations at this time    Pneumococcal Each vaccine ([id number] & [id number]) covered once after 65 Prevnar 13: 01/07/2016    [id number]: 03/15/2022     No recommendations at this time    Hepatitis B One screening covered for patients with certain risk factors   -  No recommendations at this time    Tetanus Toxoid Not covered by Medicare Part B unless medically necessary (cut with metal); may be covered with your pharmacy prescription benefits 01/09/2006    Tetanus, Diptheria and Pertusis TD and TDaP Not covered by Medicare Part B -  No recommendations at this time    Zoster Not covered by Medicare Part B; may be covered with your pharmacy  prescription benefits 09/29/2011  No recommendations at this time

## 2024-02-13 NOTE — PROGRESS NOTES
Subjective:   Eriberto Bach is a 73 year old male who presents for a MA (Medicare Advantage) Supervisit (Once per calendar year) and scheduled follow up of multiple significant but stable problems.   Up twice a night going to the bathroom    Otherwise doing well, playing tennis a lot.    Walked over 1300 miles.    No changes in living situation.      History/Other:   Fall Risk Assessment:   He has been screened for Falls and is low risk.      Cognitive Assessment:   He had a completely normal cognitive assessment - see flowsheet entries     Functional Ability/Status:   Eriberto Bach has a completely normal functional assessment. See flowsheet for details.        Depression Screening (PHQ-2/PHQ-9): PHQ-2 SCORE: 0  , done 2/12/2024   Last [place] Suicide Screening on 2/13/2024 was No Risk.       Advanced Directives:   He does have a Living Will but we do NOT have it on file in Epic.    He does have a POA but we do NOT have it on file in Epic.    Discussed Advance Care Planning with patient (and family/surrogate if present). Standard forms made available to patient in After Visit Summary.      Patient Active Problem List   Diagnosis    Impotence of organic origin    Benign prostatic hyperplasia with nocturia    H/O bilateral inguinal hernia repair    CKD (chronic kidney disease) stage 3, GFR 30-59 ml/min ([facility])    History of adenomatous polyp of colon    Thrombocytopenia (HCC)     Allergies:  He is allergic to bees.    Current Medications:  Outpatient Medications Marked as Taking for the 2/13/24 encounter (Office Visit) with Bryn Helm MD   Medication Sig    terazosin 5 MG Oral Cap Take 1 capsule (5 mg total) by mouth nightly.    Famciclovir 125 MG Oral Tab Take 1 tablet (125 mg total) by mouth 2 (two) times daily.    ibuprofen 800 MG Oral Tab 1 tab po BIDPC x 5 days, then BIDPC prn pain       Medical History:  He  has a past medical history of Abdominal hernia, Cancer (HCC), Frequent urination, Heart  palpitations, Visual impairment, and Wears glasses.  Surgical History:  He  has a past surgical history that includes vasectomy; forearm/wrist surgery unlisted; hernia surgery; hernia surgery (2016); and colonoscopy (2015).   Family History:  His family history includes Cancer in his brother; Heart Attack in his brother; Heart Disorder (age of onset: 48) in his brother.  Social History:  He  reports that he quit smoking about 53 years ago. His smoking use included cigarettes. He has never used smokeless tobacco. He reports current alcohol use of about 4.0 standard drinks of alcohol per week. He reports that he does not use drugs.    Tobacco:  He smoked tobacco in the past but quit greater than 12 months ago.  Social History    Tobacco Use      Smoking status: Former        Packs/day: 0.00        Years: 0.00        Additional pack years: 0.00        Total pack years: 0.00        Types: Cigarettes        Quit date: 1970        Years since quittin.7      Smokeless tobacco: Never         CAGE Alcohol Screen:   He has been screened for alcohol abuse and his score is not 0:  Cut: Have you ever felt you should Cut down on your drinking?: Yes  Annoyed: Have people Annoyed you by criticizing your drinking?: No  Guilty: Have you ever felt bad or Guilty about your drinking?: No  Eye Opener: Have you ever had a drink first thing in the morning to steady your nerves or to get rid of a hangover (Eye opener)?: No  Total Score: 1      Patient Care Team:  Bryn Helm MD as PCP - General (Family Medicine)    Review of Systems  Negative except some changes in vision on left eye, more floaters, thinks some vision issues in the bottom, Otherwise negative.    Objective:   Physical Exam  General Appearance:  Alert, cooperative, no distress, appears stated age   Head:  Normocephalic, without obvious abnormality, atraumatic   Eyes:  PERRL, conjunctiva/corneas clear, EOM's intact, both eyes   Ears:  Normal TM's and  external ear canals, both ears   Nose: Nares normal, septum midline, mucosa normal, no drainage or sinus tenderness   Throat: Lips, mucosa, and tongue normal; teeth and gums normal   Neck: Supple, symmetrical, trachea midline, no adenopathy, thyroid: not enlarged, symmetric, no tenderness/mass/nodules, no carotid bruit or JVD   Back:   Symmetric, no curvature, ROM normal, no CVA tenderness   Lungs:   Clear to auscultation bilaterally, respirations unlabored   Chest Wall:  No tenderness or deformity   Heart:  Regular rate and rhythm, S1, S2 normal, no murmur, rub or gallop   Abdomen:   Soft, non-tender, bowel sounds active all four quadrants,  no masses, no organomegaly           Extremities: Extremities normal, atraumatic, no cyanosis or edema   Pulses: 2+ and symmetric   Skin: Skin color, texture, turgor normal, no rashes or lesions   Lymph nodes: Cervical, supraclavicular, and axillary nodes normal   Neurologic: Normal     /74   Pulse 84   Resp 16   Ht 6' (1.829 m)   Wt 194 lb (88 kg)   SpO2 98%   BMI 26.31 kg/m²  Estimated body mass index is 26.31 kg/m² as calculated from the following:    Height as of this encounter: 6' (1.829 m).    Weight as of this encounter: 194 lb (88 kg).    Medicare Hearing Assessment:   Hearing Screening    Screening Method: Finger Rub  Finger Rub Result: Pass               Visual Acuity:   Right Eye Visual Acuity: Uncorrected Right Eye Chart Acuity: 20/30   Left Eye Visual Acuity: Uncorrected Left Eye Chart Acuity: 20/25   Both Eyes Visual Acuity: Uncorrected Both Eyes Chart Acuity: 20/25   Able To Tolerate Visual Acuity: Yes        Assessment & Plan:   Eriberto Bach is a 73 year old male who presents for a Medicare Assessment.     1. Encounter for annual health examination (Primary)  -     PSA Total, Screen; Future; Expected date: 02/13/2024  -     Lipid Panel  -     Comp Metabolic Panel (14)  -     CBC With Differential With Platelet  2. Stage 3a chronic kidney disease  (HCC) - May actually be stage 2, will follow labs and resolve if so.  -     Comp Metabolic Panel (14)  3. Thrombocytopenia (HCC) - Resolved at last check, labs today  4. H/O bilateral inguinal hernia repair - Stable, no issues  5. History of adenomatous polyp of colon - Colonoscopy for surveillance next year  6. Impotence of organic origin _Stable, no active issues  7. Benign prostatic hyperplasia with nocturia - Chronic, will try increase of terazosin  -     Terazosin HCl; Take 1 capsule (5 mg total) by mouth nightly.  Dispense: 30 capsule; Refill: 1  8. Screening for prostate cancer  -     PSA Total, Screen; Future; Expected date: 02/13/2024  9. Screening for deficiency anemia  -     CBC With Differential With Platelet  10. Screening for cardiovascular condition  -     Lipid Panel  11. Recurrent cold sores - Chronic, unpredictable, will refill medication to have on hand.  -     Famciclovir; Take 1 tablet (125 mg total) by mouth 2 (two) times daily.  Dispense: 14 tablet; Refill: 0    The patient indicates understanding of these issues and agrees to the plan.  Lab work ordered.  Reinforced healthy diet, lifestyle, and exercise.      No follow-ups on file.     Bryn Helm MD, 2/13/2024     Supplementary Documentation:   General Health:  In the past six months, have you lost more than 10 pounds without trying?: 3 - Don't know  Has your appetite been poor?: No  Type of Diet: Balanced  How does the patient maintain a good energy level?: Appropriate Exercise;Daily Walks  How would you describe your daily physical activity?: Moderate  How would you describe your current health state?: Good  How do you maintain positive mental well-being?: Social Interaction;Puzzles;Visiting Friends  On a scale of 0 to 10, with 0 being no pain and 10 being severe pain, what is your pain level?: 0 - (None)  In the past six months, have you experienced urine leakage?: 0-No  At any time do you feel concerned for the safety/well-being of  yourself and/or your children, in your home or elsewhere?: No  Have you had any immunizations at another office such as Influenza, Hepatitis B, Tetanus, or Pneumococcal?: Yes          Eriberto Bach's SCREENING SCHEDULE   Tests on this list are recommended by your physician but may not be covered, or covered at this frequency, by your insurer.   Please check with your insurance carrier before scheduling to verify coverage.   PREVENTATIVE SERVICES FREQUENCY &  COVERAGE DETAILS LAST COMPLETION DATE   Diabetes Screening    Fasting Blood Sugar / Glucose    One screening every 12 months if never tested or if previously tested but not diagnosed with pre-diabetes   One screening every 6 months if diagnosed with pre-diabetes Lab Results   Component Value Date    GLU 95 01/17/2023        Cardiovascular Disease Screening    Lipid Panel  Cholesterol  Lipoprotein (HDL)  Triglycerides Covered every 5 years for all Medicare beneficiaries without apparent signs or symptoms of cardiovascular disease Lab Results   Component Value Date    CHOLEST 197 01/17/2023    HDL 58 01/17/2023     (H) 01/17/2023    TRIG 72 01/17/2023         Electrocardiogram (EKG)   Covered if needed at Welcome to Medicare, and non-screening if indicated for medical reasons 05/28/2019      Ultrasound Screening for Abdominal Aortic Aneurysm (AAA) Covered once in a lifetime for one of the following risk factors    Men who are 65-75 years old and have ever smoked    Anyone with a family history -     Colorectal Cancer Screening  Covered for ages 50-85; only need ONE of the following:    Colonoscopy   Covered every 10 years    Covered every 2 years if patient is at high risk or previous colonoscopy was abnormal 07/06/2020    Health Maintenance   Topic Date Due    Colorectal Cancer Screening  07/06/2025       Flexible Sigmoidoscopy   Covered every 4 years -    Fecal Occult Blood Test Covered annually -   Prostate Cancer Screening    Prostate-Specific  Antigen (PSA) Annually Lab Results   Component Value Date    PSA 1.920 01/13/2016     Health Maintenance   Topic Date Due    PSA  01/17/2025      Immunizations    Influenza Covered once per flu season  Please get every year 09/28/2023  No recommendations at this time    Pneumococcal Each vaccine ([id number] & [id number]) covered once after 65 Prevnar 13: 01/07/2016    [id number]: 03/15/2022     No recommendations at this time    Hepatitis B One screening covered for patients with certain risk factors   -  No recommendations at this time    Tetanus Toxoid Not covered by Medicare Part B unless medically necessary (cut with metal); may be covered with your pharmacy prescription benefits 01/09/2006    Tetanus, Diptheria and Pertusis TD and TDaP Not covered by Medicare Part B -  No recommendations at this time    Zoster Not covered by Medicare Part B; may be covered with your pharmacy  prescription benefits 09/29/2011  No recommendations at this time

## 2024-04-05 ENCOUNTER — TELEPHONE (OUTPATIENT)
Dept: FAMILY MEDICINE CLINIC | Facility: CLINIC | Age: 74
End: 2024-04-05

## 2024-04-05 DIAGNOSIS — R35.1 BENIGN PROSTATIC HYPERPLASIA WITH NOCTURIA: ICD-10-CM

## 2024-04-05 DIAGNOSIS — N40.1 BENIGN PROSTATIC HYPERPLASIA WITH NOCTURIA: ICD-10-CM

## 2024-04-05 RX ORDER — TERAZOSIN 5 MG/1
5 CAPSULE ORAL NIGHTLY
Qty: 90 CAPSULE | Refills: 0 | Status: SHIPPED | OUTPATIENT
Start: 2024-04-05

## 2024-05-13 ENCOUNTER — OFFICE VISIT (OUTPATIENT)
Dept: FAMILY MEDICINE CLINIC | Facility: CLINIC | Age: 74
End: 2024-05-13
Payer: MEDICARE

## 2024-05-13 VITALS
TEMPERATURE: 98 F | HEART RATE: 56 BPM | SYSTOLIC BLOOD PRESSURE: 124 MMHG | DIASTOLIC BLOOD PRESSURE: 76 MMHG | OXYGEN SATURATION: 96 % | BODY MASS INDEX: 26.14 KG/M2 | RESPIRATION RATE: 16 BRPM | HEIGHT: 72 IN | WEIGHT: 193 LBS

## 2024-05-13 DIAGNOSIS — L42 PITYRIASIS ROSEA: Primary | ICD-10-CM

## 2024-05-13 DIAGNOSIS — M77.11 RIGHT TENNIS ELBOW: ICD-10-CM

## 2024-05-13 PROCEDURE — 99213 OFFICE O/P EST LOW 20 MIN: CPT | Performed by: FAMILY MEDICINE

## 2024-05-13 NOTE — PROGRESS NOTES
[facility] Progress Note    SUBJECTIVE: Eriberto Bach 74 year old male is here today for   Chief Complaint   Patient presents with    Rash     Left lower abdomen, Pt has been using triamcinolone cream           Rash on both sides of abdomen. And jennifer back and armpit. No severe symptoms    Right tennis elbow pain    PMH  Past Medical History:    Abdominal hernia    Cancer (HCC)    skin    Frequent urination    Heart palpitations    Low heart rate    Visual impairment    left eye floater    Wears glasses        PSH  Past Surgical History:   Procedure Laterality Date    Colonoscopy  03/18/2015    He, 1 polyp 5 years    Forearm/wrist surgery unlisted      ganglion    Hernia surgery      Hernia surgery  06/01/2016    Vasectomy          Social Hx:  No changes    ROS  See HPI    OBJECTIVE:  /76   Pulse 56   Temp 97.6 °F (36.4 °C)   Resp 16   Ht 6' (1.829 m)   Wt 193 lb (87.5 kg)   SpO2 96%   BMI 26.18 kg/m²     Exam  Skin: many oval pink blanching leson, a larger with central clearing lesion in left armpit      Labs:          Meds:   Current Outpatient Medications   Medication Sig Dispense Refill    terazosin 5 MG Oral Cap Take 1 capsule (5 mg total) by mouth nightly. 90 capsule 0    Famciclovir 125 MG Oral Tab Take 1 tablet (125 mg total) by mouth 2 (two) times daily. 14 tablet 0    ibuprofen 800 MG Oral Tab 1 tab po BIDPC x 5 days, then BIDPC prn pain 30 tablet 0         Assessment/Plan  Jeffrey was seen today for rash.    Diagnoses and all orders for this visit:    Pityriasis rosea    Right tennis elbow         Advised on tennis elbow exercises and bracing (he has one already) discussed normal course for pityriasis rosea.         Total Time spent with patient and coordinating care:  20 minutes.    Follow up: as needed      Bryn Helm MD

## 2024-07-04 DIAGNOSIS — N40.1 BENIGN PROSTATIC HYPERPLASIA WITH NOCTURIA: ICD-10-CM

## 2024-07-04 DIAGNOSIS — R35.1 BENIGN PROSTATIC HYPERPLASIA WITH NOCTURIA: ICD-10-CM

## 2024-07-05 RX ORDER — TERAZOSIN 5 MG/1
5 CAPSULE ORAL NIGHTLY
Qty: 90 CAPSULE | Refills: 0 | Status: SHIPPED | OUTPATIENT
Start: 2024-07-05

## 2024-10-14 DIAGNOSIS — N40.1 BENIGN PROSTATIC HYPERPLASIA WITH NOCTURIA: ICD-10-CM

## 2024-10-14 DIAGNOSIS — R35.1 BENIGN PROSTATIC HYPERPLASIA WITH NOCTURIA: ICD-10-CM

## 2024-10-14 RX ORDER — TERAZOSIN 5 MG/1
5 CAPSULE ORAL NIGHTLY
Qty: 90 CAPSULE | Refills: 0 | Status: SHIPPED | OUTPATIENT
Start: 2024-10-14

## 2024-11-18 ENCOUNTER — OFFICE VISIT (OUTPATIENT)
Dept: FAMILY MEDICINE CLINIC | Facility: CLINIC | Age: 74
End: 2024-11-18
Payer: MEDICARE

## 2024-11-18 ENCOUNTER — EKG ENCOUNTER (OUTPATIENT)
Dept: LAB | Age: 74
End: 2024-11-18
Attending: FAMILY MEDICINE
Payer: MEDICARE

## 2024-11-18 VITALS
RESPIRATION RATE: 15 BRPM | BODY MASS INDEX: 26.68 KG/M2 | WEIGHT: 197 LBS | OXYGEN SATURATION: 98 % | SYSTOLIC BLOOD PRESSURE: 132 MMHG | HEART RATE: 39 BPM | HEIGHT: 72 IN | DIASTOLIC BLOOD PRESSURE: 62 MMHG

## 2024-11-18 DIAGNOSIS — R07.89 ATYPICAL CHEST PAIN: ICD-10-CM

## 2024-11-18 DIAGNOSIS — L82.1 SEBORRHEIC KERATOSES: ICD-10-CM

## 2024-11-18 DIAGNOSIS — D17.0 LIPOMA OF NECK: ICD-10-CM

## 2024-11-18 DIAGNOSIS — G56.02 CARPAL TUNNEL SYNDROME OF LEFT WRIST: Primary | ICD-10-CM

## 2024-11-18 DIAGNOSIS — S23.8XXA SPRAIN OF RHOMBOID, INITIAL ENCOUNTER: ICD-10-CM

## 2024-11-18 PROCEDURE — 1160F RVW MEDS BY RX/DR IN RCRD: CPT | Performed by: FAMILY MEDICINE

## 2024-11-18 PROCEDURE — 1159F MED LIST DOCD IN RCRD: CPT | Performed by: FAMILY MEDICINE

## 2024-11-18 PROCEDURE — 99214 OFFICE O/P EST MOD 30 MIN: CPT | Performed by: FAMILY MEDICINE

## 2024-11-18 NOTE — PROGRESS NOTES
Subjective:   Patient ID: Eriberto Bach is a 74 year old male.    Left posterior back pain, just medial to scapula. X 2 weeks.  No injury.  Possibly from activity.      Atypical left lateral chest pain.  Occurs occasionally last less then 1 min.  ROM helps.  No SOB, N,V, sweats.  Improving.    Mole on back that he was worried about.    8 yrs a lump on anterior neck unchanged. No other neck masses.  No pain.        History/Other:   Review of Systems   All other systems reviewed and are negative.    Current Outpatient Medications   Medication Sig Dispense Refill    TERAZOSIN 5 MG Oral Cap TAKE ONE CAPSULE BY MOUTH NIGHTLY 90 capsule 0    Famciclovir 125 MG Oral Tab Take 1 tablet (125 mg total) by mouth 2 (two) times daily. 14 tablet 0    ibuprofen 800 MG Oral Tab 1 tab po BIDPC x 5 days, then BIDPC prn pain 30 tablet 0     Allergies:Allergies[1]    Objective:   Physical Exam  Vitals reviewed.   Constitutional:       General: He is not in acute distress.     Appearance: He is well-developed. He is not diaphoretic.   Eyes:      General: No scleral icterus.        Right eye: No discharge.         Left eye: No discharge.      Conjunctiva/sclera: Conjunctivae normal.   Neck:      Comments: Lipoma left ant neck 1.5 cm x 1 cm.  Cardiovascular:      Rate and Rhythm: Normal rate and regular rhythm.      Heart sounds: Normal heart sounds.   Pulmonary:      Effort: Pulmonary effort is normal. No respiratory distress.      Breath sounds: Normal breath sounds. No wheezing or rales.   Musculoskeletal:         General: Normal range of motion.      Comments: Rotator shoulder exam normal.  Doing push up against wall, scapula seems fine.  Tenderness medial to scalpula on left.      Carpal tunnel compression on left gives 5th digit numbness.  Negative cubital tunnel and spurlings test.   Skin:     Comments: Seborrheic keratosis on left thoracic area.         Assessment & Plan:   1. Carpal tunnel syndrome of left wrist    2. Sprain  of rhomboid, initial encounter    3. Atypical chest pain    4. Seborrheic keratoses    5. Lipoma of neck      1. Carpal tunnel syndrome of left wrist  Wear wrist splint.  Follow up if not better in 4 weeks    2. Sprain of rhomboid, initial encounter  Follow up if not better in 4 weeks.  - OP REFERRAL TO [facility]    3. Atypical chest pain  - EKG 12 Lead; Future    4. Seborrheic keratoses  Reassured.    5. Lipoma of neck  Reassured.    Meds This Visit:  Requested Prescriptions      No prescriptions requested or ordered in this encounter       Imaging & Referrals:  OP REFERRAL TO [facility]         [1]   Allergies  Allergen Reactions    Bees

## 2024-11-19 LAB
ATRIAL RATE: 51 BPM
Q-T INTERVAL: 514 MS
QRS DURATION: 84 MS
QTC CALCULATION (BEZET): 418 MS
R AXIS: -25 DEGREES
T AXIS: -55 DEGREES
VENTRICULAR RATE: 40 BPM

## 2024-11-25 ENCOUNTER — LAB ENCOUNTER (OUTPATIENT)
Dept: LAB | Age: 74
End: 2024-11-25
Attending: INTERNAL MEDICINE
Payer: MEDICARE

## 2024-11-25 DIAGNOSIS — R00.1 SEVERE SINUS BRADYCARDIA: ICD-10-CM

## 2024-11-25 DIAGNOSIS — R07.89 OTHER CHEST PAIN: ICD-10-CM

## 2024-11-25 DIAGNOSIS — R94.31 NONSPECIFIC ABNORMAL ELECTROCARDIOGRAM (ECG) (EKG): ICD-10-CM

## 2024-11-25 DIAGNOSIS — Z82.49 FAMILY HISTORY OF ISCHEMIC HEART DISEASE: ICD-10-CM

## 2024-11-25 DIAGNOSIS — I48.91 ATRIAL FIBRILLATION (HCC): Primary | ICD-10-CM

## 2024-11-25 LAB
ALBUMIN SERPL-MCNC: 4.5 G/DL (ref 3.2–4.8)
ALBUMIN/GLOB SERPL: 1.8 {RATIO} (ref 1–2)
ALP LIVER SERPL-CCNC: 66 U/L
ALT SERPL-CCNC: 11 U/L
ANION GAP SERPL CALC-SCNC: 6 MMOL/L (ref 0–18)
AST SERPL-CCNC: 18 U/L (ref ?–34)
BASOPHILS # BLD AUTO: 0.04 X10(3) UL (ref 0–0.2)
BASOPHILS NFR BLD AUTO: 0.8 %
BILIRUB SERPL-MCNC: 0.8 MG/DL (ref 0.2–1.1)
BUN BLD-MCNC: 27 MG/DL (ref 9–23)
CALCIUM BLD-MCNC: 10.4 MG/DL (ref 8.7–10.4)
CHLORIDE SERPL-SCNC: 108 MMOL/L (ref 98–112)
CHOLEST SERPL-MCNC: 179 MG/DL (ref ?–200)
CO2 SERPL-SCNC: 28 MMOL/L (ref 21–32)
CREAT BLD-MCNC: 1.35 MG/DL
EGFRCR SERPLBLD CKD-EPI 2021: 55 ML/MIN/1.73M2 (ref 60–?)
EOSINOPHIL # BLD AUTO: 0.1 X10(3) UL (ref 0–0.7)
EOSINOPHIL NFR BLD AUTO: 2 %
ERYTHROCYTE [DISTWIDTH] IN BLOOD BY AUTOMATED COUNT: 12.9 %
FASTING PATIENT LIPID ANSWER: YES
FASTING STATUS PATIENT QL REPORTED: YES
GLOBULIN PLAS-MCNC: 2.5 G/DL (ref 2–3.5)
GLUCOSE BLD-MCNC: 94 MG/DL (ref 70–99)
HCT VFR BLD AUTO: 45.2 %
HDLC SERPL-MCNC: 50 MG/DL (ref 40–59)
HGB BLD-MCNC: 14.9 G/DL
IMM GRANULOCYTES # BLD AUTO: 0.02 X10(3) UL (ref 0–1)
IMM GRANULOCYTES NFR BLD: 0.4 %
LDLC SERPL CALC-MCNC: 114 MG/DL (ref ?–100)
LYMPHOCYTES # BLD AUTO: 1.39 X10(3) UL (ref 1–4)
LYMPHOCYTES NFR BLD AUTO: 27.7 %
MCH RBC QN AUTO: 32 PG (ref 26–34)
MCHC RBC AUTO-ENTMCNC: 33 G/DL (ref 31–37)
MCV RBC AUTO: 97.2 FL
MONOCYTES # BLD AUTO: 0.43 X10(3) UL (ref 0.1–1)
MONOCYTES NFR BLD AUTO: 8.6 %
NEUTROPHILS # BLD AUTO: 3.04 X10 (3) UL (ref 1.5–7.7)
NEUTROPHILS # BLD AUTO: 3.04 X10(3) UL (ref 1.5–7.7)
NEUTROPHILS NFR BLD AUTO: 60.5 %
NONHDLC SERPL-MCNC: 129 MG/DL (ref ?–130)
OSMOLALITY SERPL CALC.SUM OF ELEC: 299 MOSM/KG (ref 275–295)
PLATELET # BLD AUTO: 167 10(3)UL (ref 150–450)
POTASSIUM SERPL-SCNC: 4.2 MMOL/L (ref 3.5–5.1)
PROT SERPL-MCNC: 7 G/DL (ref 5.7–8.2)
RBC # BLD AUTO: 4.65 X10(6)UL
SODIUM SERPL-SCNC: 142 MMOL/L (ref 136–145)
TRIGL SERPL-MCNC: 79 MG/DL (ref 30–149)
TSI SER-ACNC: 3.03 UIU/ML (ref 0.55–4.78)
VLDLC SERPL CALC-MCNC: 14 MG/DL (ref 0–30)
WBC # BLD AUTO: 5 X10(3) UL (ref 4–11)

## 2024-11-25 PROCEDURE — 36415 COLL VENOUS BLD VENIPUNCTURE: CPT

## 2024-11-25 PROCEDURE — 80061 LIPID PANEL: CPT

## 2024-11-25 PROCEDURE — 80053 COMPREHEN METABOLIC PANEL: CPT

## 2024-11-25 PROCEDURE — 84443 ASSAY THYROID STIM HORMONE: CPT

## 2024-11-25 PROCEDURE — 85025 COMPLETE CBC W/AUTO DIFF WBC: CPT

## 2024-12-05 ENCOUNTER — OFFICE VISIT (OUTPATIENT)
Dept: FAMILY MEDICINE CLINIC | Facility: CLINIC | Age: 74
End: 2024-12-05
Payer: MEDICARE

## 2024-12-05 VITALS
RESPIRATION RATE: 18 BRPM | OXYGEN SATURATION: 97 % | SYSTOLIC BLOOD PRESSURE: 140 MMHG | TEMPERATURE: 98 F | HEART RATE: 41 BPM | DIASTOLIC BLOOD PRESSURE: 60 MMHG | WEIGHT: 197 LBS | BODY MASS INDEX: 27 KG/M2

## 2024-12-05 DIAGNOSIS — R52 PAIN: ICD-10-CM

## 2024-12-05 DIAGNOSIS — S23.8XXA SPRAIN OF RHOMBOID, INITIAL ENCOUNTER: Primary | ICD-10-CM

## 2024-12-05 PROCEDURE — 1125F AMNT PAIN NOTED PAIN PRSNT: CPT | Performed by: NURSE PRACTITIONER

## 2024-12-05 PROCEDURE — 1160F RVW MEDS BY RX/DR IN RCRD: CPT | Performed by: NURSE PRACTITIONER

## 2024-12-05 PROCEDURE — 1159F MED LIST DOCD IN RCRD: CPT | Performed by: NURSE PRACTITIONER

## 2024-12-05 PROCEDURE — 99213 OFFICE O/P EST LOW 20 MIN: CPT | Performed by: NURSE PRACTITIONER

## 2024-12-05 RX ORDER — LIDOCAINE 4 G/G
1 PATCH TOPICAL EVERY 24 HOURS
Qty: 15 PATCH | Refills: 0 | Status: SHIPPED | OUTPATIENT
Start: 2024-12-05

## 2024-12-05 RX ORDER — ASPIRIN 81 MG/1
81 TABLET ORAL DAILY
COMMUNITY

## 2024-12-05 NOTE — PROGRESS NOTES
Eriberto Bach is a 74 year old male.  Shoulder Pain (Left shoulder pain, saw Dr on , pain got better, last Monday was out trimming trees and playing, can't sleep, using advil.)  HPI:   Patient's presenting with pain to left back.  Cause - was putting out [employer] decorations on a ladder and playing tennis.   Onset - last week  Location of pain - left shoulder  Pain described as achy  Pain scale - 4/10  Radiation - none  Pain is aggravated by movement, use and palpation  Pain is alleviated by moving.   Associated symptoms:  none  Care prior to arrival consisted of advil which didn't really help.   Has out patient physical therapy ordered, but didn't go because symptoms did improve.   Hard to sleep at night due to pain    Current Outpatient Medications   Medication Sig Dispense Refill    aspirin 81 MG Oral Tab EC Take 1 tablet (81 mg total) by mouth daily.      lidocaine (HM LIDOCAINE PATCH) 4 % External Patch Place 1 patch onto the skin daily. 12 hours on. 12 hours off. 15 patch 0    TERAZOSIN 5 MG Oral Cap TAKE ONE CAPSULE BY MOUTH NIGHTLY 90 capsule 0    Famciclovir 125 MG Oral Tab Take 1 tablet (125 mg total) by mouth 2 (two) times daily. 14 tablet 0    ibuprofen 800 MG Oral Tab 1 tab po BIDPC x 5 days, then BIDPC prn pain 30 tablet 0      Past Medical History:    Abdominal hernia    Cancer (HCC)    skin    Frequent urination    Heart palpitations    Low heart rate    Visual impairment    left eye floater    Wears glasses      Social History:  Social History     Socioeconomic History    Marital status:    Tobacco Use    Smoking status: Former     Current packs/day: 0.00     Types: Cigarettes     Quit date: 1970     Years since quittin.5    Smokeless tobacco: Never   Vaping Use    Vaping status: Never Used   Substance and Sexual Activity    Alcohol use: Yes     Alcohol/week: 4.0 standard drinks of alcohol     Types: 4 Glasses of wine per week     Comment: 4-5 per week    Drug use: No    Other Topics Concern    Caffeine Concern Yes    Exercise Yes     Comment: tennis 2 x week. and runs        REVIEW OF SYSTEMS:   GENERAL HEALTH: feels well otherwise  SKIN: denies any unusual skin lesions or rashes, did not have immediate swelling to area.   RESPIRATORY: denies shortness of breath with exertion  CARDIOVASCULAR: denies chest pain on exertion  GI: denies abdominal pain and denies heartburn  NEURO: denies numbness and tingling sensation.     EXAM:   /60   Pulse (!) 41   Temp 97.8 °F (36.6 °C) (Oral)   Resp 18   Wt 197 lb (89.4 kg)   SpO2 97%   BMI 26.72 kg/m²   GENERAL: well developed, well nourished,in no apparent distress  SKIN: no rashes,no suspicious lesions, skin's intact.   HEENT: atraumatic, normocephalic,ears and throat are clear  NECK: supple,no adenopathy,no bruits  LUNGS: clear to auscultation  CARDIO: s1s2 without murmur  EXTREMITIES: no cyanosis, clubbing or edema  NEURO: normal sensation distally and normal cap refill distally.  Exam of left shoulder   - swelling: no  - tenderness: + rhomboid left tenderness and muscle knot  - deformity/defect: none obvious  - crepitus: no  - ecchymosis/bruising: no  - length/size (in cm): no  - wound free of debris/FB: no  - tendon / deep structures intact: intact. Full ROM  - Range of motion: Full ROM  - left radiat pulses: 2+  - sensation: intact  - Motor exam/strength: intact        ASSESSMENT AND PLAN:   Assessment:   Encounter Diagnoses   Name Primary?    Pain     Sprain of rhomboid, initial encounter Yes     1. Pain  - lidocaine (HM LIDOCAINE PATCH) 4 % External Patch; Place 1 patch onto the skin daily. 12 hours on. 12 hours off.  Dispense: 15 patch; Refill: 0    2. Sprain of rhomboid, initial encounter    F/u with PT      Plan:  Rest, ice, elevation, NSAID.   Follow-up with PCP for worsening symptoms or no improvement  Medication use, dose, and possible side effects discussed.    Meds & Refills for this Visit:  Requested Prescriptions      Signed Prescriptions Disp Refills    lidocaine (HM LIDOCAINE PATCH) 4 % External Patch 15 patch 0     Sig: Place 1 patch onto the skin daily. 12 hours on. 12 hours off.           The patient/parents indicate understanding of these issues and agrees to the plan.

## 2024-12-06 ENCOUNTER — TELEPHONE (OUTPATIENT)
Dept: PHYSICAL THERAPY | Facility: HOSPITAL | Age: 74
End: 2024-12-06

## 2024-12-09 ENCOUNTER — OFFICE VISIT (OUTPATIENT)
Dept: FAMILY MEDICINE CLINIC | Facility: CLINIC | Age: 74
End: 2024-12-09
Payer: MEDICARE

## 2024-12-09 VITALS
SYSTOLIC BLOOD PRESSURE: 140 MMHG | HEART RATE: 39 BPM | HEIGHT: 72 IN | RESPIRATION RATE: 15 BRPM | BODY MASS INDEX: 26.68 KG/M2 | OXYGEN SATURATION: 97 % | WEIGHT: 197 LBS | DIASTOLIC BLOOD PRESSURE: 58 MMHG

## 2024-12-09 DIAGNOSIS — R07.89 ATYPICAL CHEST PAIN: Primary | ICD-10-CM

## 2024-12-09 DIAGNOSIS — M54.6 ACUTE LEFT-SIDED THORACIC BACK PAIN: ICD-10-CM

## 2024-12-09 PROCEDURE — 1159F MED LIST DOCD IN RCRD: CPT | Performed by: FAMILY MEDICINE

## 2024-12-09 PROCEDURE — 1160F RVW MEDS BY RX/DR IN RCRD: CPT | Performed by: FAMILY MEDICINE

## 2024-12-09 PROCEDURE — 99214 OFFICE O/P EST MOD 30 MIN: CPT | Performed by: FAMILY MEDICINE

## 2024-12-09 RX ORDER — HYDROCODONE BITARTRATE AND ACETAMINOPHEN 5; 325 MG/1; MG/1
1 TABLET ORAL 2 TIMES DAILY PRN
Qty: 20 TABLET | Refills: 0 | Status: SHIPPED | OUTPATIENT
Start: 2024-12-09

## 2024-12-09 NOTE — PROGRESS NOTES
Subjective:   Patient ID: Eriberto Bach is a 74 year old male.    Left sided chest pain x 1 month.  Just yesterday noted swelling of left breast.  + left thoracic back pain at same level.  No CP/SOB.  When walking he swings his arms and that causes pain.  No n/v/sweats.  + left elbow pain and numbness in 5th and 4th digit.        History/Other:   Review of Systems   All other systems reviewed and are negative.    Current Outpatient Medications   Medication Sig Dispense Refill    HYDROcodone-acetaminophen (NORCO) 5-325 MG Oral Tab Take 1 tablet by mouth 2 (two) times daily as needed for Pain. 20 tablet 0    aspirin 81 MG Oral Tab EC Take 1 tablet (81 mg total) by mouth daily.      lidocaine (HM LIDOCAINE PATCH) 4 % External Patch Place 1 patch onto the skin daily. 12 hours on. 12 hours off. 15 patch 0    TERAZOSIN 5 MG Oral Cap TAKE ONE CAPSULE BY MOUTH NIGHTLY 90 capsule 0    Famciclovir 125 MG Oral Tab Take 1 tablet (125 mg total) by mouth 2 (two) times daily. 14 tablet 0    ibuprofen 800 MG Oral Tab 1 tab po BIDPC x 5 days, then BIDPC prn pain 30 tablet 0     Allergies:Allergies[1]    Objective:   Physical Exam  Vitals reviewed.   Constitutional:       General: He is not in acute distress.     Appearance: He is well-developed. He is not diaphoretic.   Eyes:      General: No scleral icterus.        Right eye: No discharge.         Left eye: No discharge.      Conjunctiva/sclera: Conjunctivae normal.   Cardiovascular:      Rate and Rhythm: Normal rate and regular rhythm.      Heart sounds: Normal heart sounds.      Comments: Left breast appears swollen visibly.  No tenderness.  No mass felt.  No axillary lymphnodes.    Left thoracic area of pain,  the ribs sink in at where ribs attach to spine.  Pulmonary:      Effort: Pulmonary effort is normal. No respiratory distress.      Breath sounds: Normal breath sounds. No wheezing or rales.         Assessment & Plan:   1. Atypical chest pain    2. Acute left-sided  thoracic back pain        No orders of the defined types were placed in this encounter.      Meds This Visit:  Requested Prescriptions     Signed Prescriptions Disp Refills    HYDROcodone-acetaminophen (NORCO) 5-325 MG Oral Tab 20 tablet 0     Sig: Take 1 tablet by mouth 2 (two) times daily as needed for Pain.       Imaging & Referrals:  CT CHEST (CPT=71250)  US CHEST (CPT=[id number])         [1]   Allergies  Allergen Reactions    Bees

## 2024-12-12 ENCOUNTER — OFFICE VISIT (OUTPATIENT)
Dept: PHYSICAL THERAPY | Facility: HOSPITAL | Age: 74
End: 2024-12-12
Attending: FAMILY MEDICINE
Payer: MEDICARE

## 2024-12-12 DIAGNOSIS — S23.8XXA SPRAIN OF RHOMBOID, INITIAL ENCOUNTER: Primary | ICD-10-CM

## 2024-12-12 PROCEDURE — 97140 MANUAL THERAPY 1/> REGIONS: CPT

## 2024-12-12 PROCEDURE — 97162 PT EVAL MOD COMPLEX 30 MIN: CPT

## 2024-12-12 NOTE — PROGRESS NOTES
SPINE EVALUATION:     Diagnosis:   Sprain of rhomboid, initial encounter (S23.8XXA)     Referring Provider: Akash Stiles  Date of Evaluation:    12/12/2024    Precautions:  None Next MD visit: none scheduled  Date of Surgery: n/a     PATIENT SUMMARY   Eriberto Bach is a 74 year old male who presents to therapy today with complaints of left shoulder and scapular pain which started 1 month ago. He walks a lot and plays tennis 4 times per week. The pain started worsening, so she saw the MD but by then the arm was fine. He then went back to playing tennis. Last Sunday it was cold and he had yardwork to do with leaves, when he pulled the lawnmower chain he had pain in the shoulder. He played tennis Monday night and then had a lot of pain after that. He has not played tennis or walked since then, but normally he walks a lot. He has tried walking twice, it makes the arm feel worse. He has seen Dr. Stiles twice and went to urgent care once to try to get a medication. He also has CT scans scheduled because he has pain in the axilla and there was swelling in the left breast tissue. The patient then saw an orthopedic physician, who thinks it is a pinched nerve.     Pt is a , there is constant vibration on the left hand with use of the tool, and he hasn't done that in 2 weeks because he is not comfortable doing it. Pt reports that he has an extremely low heart rate, it is 45 bpm currently.     Pt is supposed to fly to [place] to see his Grandkids on 23rd. Feels like he cannot lift the bag overhead and is considering cancelling the trip.    Description of Symptoms:   P1: L scapular pain along medial scapular border, intermittent  P2: left lateral elbow pain, intermittent (history of R tennis elbow)  P3: numbness 4th and 5th fingers on L hand  Relationship of Symptoms: feels like when the scapular pain is worse the tingling is worse, seems connected.   Aggravating Factors:  bending head to eat, slouching,  walking, difficulty tieing shoes, avoiding using L arm with opening microwave and lifting gallon of milk. Avoiding: tennis, walking, woodworking.   Relieving Factors: shoulder rotations, bringing shoulder into extension, Barkody sign, sitting up tall, heating pad with sitting, ice on back  24 hour pattern: worse in AM when he sits up to get dressed  Irritability: symptoms go away when he gos inside to sit down.   Hand Dominance: R handed  Sleep: pt sleeps ok, takes medication before he goes to bed. These symptoms do not wake him up from sleep  Imaging: US chest and CT chest are scheduled.   Cervical spine x-ray:  X-rays of the cervical spine AP and lateral demonstrate degenerative  changes with disc space narrowing most noted at the C6-C7 level. No acute  process noted.      Pt describes pain level current 5/10, at best 5/10, at worst 6/10.   Current functional limitations include eating, walking, sitting, tieing shoes, lifting milk, pulling microwave door, tennis, woodworking.     Eriberto describes prior level of function pt was playing tennis 4 times per week and walking for exercise without limitation. He was able to perform yardwork activities without limitation. Pt goals include return to playing tennis and walk. .  Past medical history was reviewed with Eriberto. Significant findings include    has a past medical history of Abdominal hernia, Cancer (HCC), Frequent urination, Heart palpitations, Visual impairment, and Wears glasses.    Pt denies diplopia, dysarthria, dysphasia, dizziness, drop attacks, numbness/tingling.     ASSESSMENT  Eriberto presents to physical therapy evaluation with primary c/o left scapular pain and numbness in fingers of left hand. The results of the objective tests and measures show impaired cervical spine range of motion, reproduction of scapular pain with cervical spine mobility,  weakness, myotomal weakness, segmental hypomobility in lower cervical spine.  Functional deficits  include but are not limited to eating, walking, sitting, tieing shoes, lifting milk, pulling microwave door, tennis, woodworking.  Signs and symptoms are consistent with diagnosis of L cervical radiculopathy, likely C7/C8 level. Pt and PT discussed evaluation findings, pathology, POC and HEP.  Pt voiced understanding and performs HEP correctly without reported pain. Skilled Physical Therapy is medically necessary to address the above impairments and reach functional goals.     OBJECTIVE:   Observation/Posture: the patient demonstrates cervical protraction/forward head posture at rest, and rounded shoulder posture at rest  Vitals: /60  Neuro Screen: Reflexes are 2+ at biceps, triceps, brachioradialis. Inverted supinator (-), Christy (-). Myotomal strength is 5/5 except L triceps is 4+, L finger abduction 4-/5, L thumb extension 4-/5.     Cervical AROM: (* denotes performed with pain)  Flexion: 58  Extension: 40 pain in scapula, 55 after UPA mobilizations  Sidebending: R 25; L 20  Rotation: R 60; L 60 (shoulder pain reproduced)    Accessory motion: CPA assessment: there is local hypomobility at C7, T1. No pain or paresthesias reproduced. UPA assessment: hypomobility and pain reproduced at C5-6, C6-7 on L. Most pain at C6-7, reproduction of arm symptoms. Thoracic CPA is hypomobile generally in mid-upper thoracic spine, this is not painful. Thoracic UPA is painful on L T1-3.   Palpation: there is increased soft tissue tension and presence of tender point along medial scapular border on L, and into upper trapezius on L    Strength: (* denotes performed with pain)  UE/Scapular   Shoulder ABD (C5): R 5/5, L 5/5  Biceps (C6): R 5/5, L 5/5  Wrist ext (C6): R 5/5, L 5/5  Triceps (C7): R 5/5, L 4+/5  Wrist Flex (C7): R 5/5, L 5/5  Thumb Ext (C8): R 5/5, L 4-/5  Interossei (T1): R 5/5, L 4-/5     Strength: R 97 lbs; L 64.4 lbs (71 pounds after UPA mobilizations)       Special tests:   Distraction + for increased  shoulder pain      Gait: pt ambulates on level ground with normal mechanics.    Today’s Treatment and Response:   Pt education was provided on exam findings, treatment diagnosis, treatment plan, expectations, and prognosis. Pt was also provided recommendations for activity modifications, possible soreness after evaluation, postural corrections, importance of remaining active, and instruction to put hand in pocket while ambulating . Performed UPA at C6-7 on L side grade III  Patient was instructed in and issued a HEP for: none today, will see pt tomorrow and issue HEP    Charges: PT Eval Moderate Complexity, manual therapy x1      Total Timed Treatment: 9 min     Total Treatment Time: 45 min     Based on clinical rationale and outcome measures, this evaluation involved Moderate Complexity decision making due to 1-2 personal factors/comorbidities, 3 body structures involved/activity limitations, and evolving symptoms including changing pain levels.  PLAN OF CARE:    Goals: (to be met in 8 visits)   The patient will demonstrate at least 65 deg of cervical rotation AROM bilaterally  The patient will demonstrate at least 4+ finger abduction strength  The patient will demonstrate at least 50 deg pain free cervical extension AROM  The patient will demonstrate at least 70 pounds  strength on L UE  The patient will report being able to return to walking for exercise  The patient will report being able to return to tennis without limitation    Frequency / Duration: Patient will be seen for 2-3 x/week or a total of 8 visits over a 90 day period. Treatment will include: Manual Therapy, Mechanical Traction, Neuromuscular Re-education, Self-Care Home Management, Therapeutic Activities, Therapeutic Exercise, and Home Exercise Program instruction    Education or treatment limitation: None  Rehab Potential:good    Oswestry Disability Index Score  28%    Patient/Family/Caregiver was advised of these findings, precautions, and  treatment options and has agreed to actively participate in planning and for this course of care.    Thank you for your referral. Please co-sign or sign and return this letter via fax as soon as possible to [phone]. If you have any questions, please contact me at Dept: [phone]    Sincerely,  Electronically signed by therapist: Aurora Westbrook PT    Physician's certification required: Yes  I certify the need for these services furnished under this plan of treatment and while under my care.    X___________________________________________________ Date____________________    Certification From: 12/12/2024  To:3/12/2025

## 2024-12-13 ENCOUNTER — PATIENT MESSAGE (OUTPATIENT)
Dept: CASE MANAGEMENT | Age: 74
End: 2024-12-13

## 2024-12-13 ENCOUNTER — OFFICE VISIT (OUTPATIENT)
Dept: PHYSICAL THERAPY | Facility: HOSPITAL | Age: 74
End: 2024-12-13
Attending: FAMILY MEDICINE
Payer: MEDICARE

## 2024-12-13 PROCEDURE — 97140 MANUAL THERAPY 1/> REGIONS: CPT

## 2024-12-13 PROCEDURE — 97110 THERAPEUTIC EXERCISES: CPT

## 2024-12-13 NOTE — PROGRESS NOTES
Diagnosis:   Sprain of rhomboid, initial encounter (S23.8XXA)    Referring Provider: Akash Stiles  Date of Evaluation:    12/12/2024    Precautions:  None Next MD visit: none scheduled  Date of Surgery: n/a   Insurance Primary/Secondary: DANE[employer] [facility] / N/A     # Auth Visits: 8 POC            Subjective: The patient reports that he is feeling a lot better. He felt great after the last session and he has better use of the arm. He has some pain in the shoulder blade still. He has taken the medication as well today.     Pain: 3-3.5/10      Objective:     12/13/2024  Cervical ext: 46, pinching. 55 after UPA, pulling, no pinch      Assessment: The patient reported improvements in symptoms following the initial evaluation. Cervical extension AROM reproduces his scapular pain, and the patient had intermittent tingling in the fingers with cervical traction. He had increased pain with PA in cervical spine today as compared to yesterday, therefore performed gentle mobilizations at the most painful level (C6-7), but then moved to C5-6 as the patient tolerated this better and there was some hypomobility at this level.       Goals:   (to be met in 8 visits)  PROGRESSING  The patient will demonstrate at least 65 deg of cervical rotation AROM bilaterally  The patient will demonstrate at least 4+ finger abduction strength  The patient will demonstrate at least 50 deg pain free cervical extension AROM  The patient will demonstrate at least 70 pounds  strength on L UE  The patient will report being able to return to walking for exercise  The patient will report being able to return to tennis without limitation      Plan: check  strength, continue cervical PA mobilizations  Date: 12/13/2024  TX#: 2/8 Date:                 TX#: 3/ Date:                 TX#: 4/ Date:                 TX#: 5/ Date:   Tx#: 6/   Manual Therapy  UPA C6-7 grade III- on L  UPA C5-6 grade III on L  Manual traction in supine, intermittent, multiple  bouts    X28 min       Therapeutic Exercise  Gripping green putty, issued for home  Seated cervical retraction 2x10  Low row red TB 2x10  Shoulder extension red TB 2x10  Towel slides on wall into flexion 2x10  X16 min       X       X       HEP:   Access Code: [email or web address]  URL: [email or web address]  Date: 12/15/2024  Prepared by: Aurora    Exercises  - Putty Squeezes  - 1 x daily - 7 x weekly - 1 sets - 5 reps  - Standing Low Shoulder Row with Anchored Resistance  - 1 x daily - 7 x weekly - 3 sets - 10 reps  - Shoulder Flexion Wall Slide with Towel  - 2 x daily - 7 x weekly - 1 sets - 10 reps  - Seated Cervical Retraction  - 2 x daily - 7 x weekly - 2 sets - 10 reps    Charges: manual therapy x2, therapeutic exercise x1       Total Timed Treatment: 44 min  Total Treatment Time: 44 min

## 2024-12-16 ENCOUNTER — APPOINTMENT (OUTPATIENT)
Dept: PHYSICAL THERAPY | Facility: HOSPITAL | Age: 74
End: 2024-12-16
Attending: FAMILY MEDICINE
Payer: MEDICARE

## 2024-12-16 ENCOUNTER — TELEPHONE (OUTPATIENT)
Dept: FAMILY MEDICINE CLINIC | Facility: CLINIC | Age: 74
End: 2024-12-16

## 2024-12-16 PROCEDURE — 97140 MANUAL THERAPY 1/> REGIONS: CPT

## 2024-12-16 PROCEDURE — 97110 THERAPEUTIC EXERCISES: CPT

## 2024-12-16 NOTE — PROGRESS NOTES
Diagnosis:   Sprain of rhomboid, initial encounter (S23.8XXA)    Referring Provider: Akash Stiles  Date of Evaluation:    12/12/2024    Precautions:  None Next MD visit: none scheduled  Date of Surgery: n/a   Insurance Primary/Secondary: DANIEL [facility] / N/A     # Auth Visits: 8 POC            Subjective: The patient states that he has some tension in the neck, he feels like his pinky is weak. He overdid the putty, and the forearm was sore. The hardest exercise is the neck one. He still has numbness and tingling in the last 2 fingers, and in the back of the upper arm. He notes that he feels comfortable getting on a plane at this point. He walked 4 miles the other day, and then 3 miles the next day. Every now and again he was holding the arm up in his sleeve to reduce the tingling. He has a little pain in the shoulder blade.     Pain: 1-2/10 scapular pain      Objective:     12/13/2024  Cervical ext: 46, pinching. 55 after UPA, pulling, no pinch    12/16  Cervical extension: 55  Thumb extension L near equal JEANNE, finger abduction: weaker L 4+/5  : 70.4 on L   ULTT Base 1: no provovation of familiar symptoms, but the patient had increased pulling in L UE with increased forearm pulling as elbow extended.         Assessment: The patient had minimal tenderness in the cervical spine PA mobilizations today. He did have radiating symptoms into the left upper extremity with UPA at C6-7 on the left side. The patient had nearly the same  strength as the initial eval, but his myotomal strength into finger abduction and thumb extension was improved from initial eval. The patient is showing signs of centralization of symptoms. Will plan to continue with manual therapy interventions to the cervical spine and to work to achieve centralization of arm symptoms.       Goals:   (to be met in 8 visits)  PROGRESSING  The patient will demonstrate at least 65 deg of cervical rotation AROM bilaterally  The patient will demonstrate at  least 4+ finger abduction strength  The patient will demonstrate at least 50 deg pain free cervical extension AROM  The patient will demonstrate at least 70 pounds  strength on L UE  The patient will report being able to return to walking for exercise  The patient will report being able to return to tennis without limitation      Plan: check  strength, foam beam stretching  Date: 12/13/2024  TX#: 2/8 Date: 12/16                TX#: 3/8 Date:                 TX#: 4/ Date:                 TX#: 5/ Date:   Tx#: 6/   Manual Therapy  UPA C6-7 grade III- on L  UPA C5-6 grade III on L  Manual traction in supine, intermittent, multiple bouts    X28 min Manual Therapy  UPA L C6-7 grade III+  Manual traction multiple bouts in supine    X26 min      Therapeutic Exercise  Gripping green putty, issued for home  Seated cervical retraction 2x10  Low row red TB 2x10  Shoulder extension red TB 2x10  Towel slides on wall into flexion 2x10  X16 min Therapeutic Exercise  Open book 10x ea  Towel slides 2x10  Scapular retraction red TB 3x8  Putty opposition pinky to thumb yellow sponge 10x    x18 min      X X      X X      HEP:   Access Code: [email or web address]  URL: [email or web address]  Date: 12/16/2024  Prepared by: Aurora    Exercises  - Putty Squeezes  - 1 x daily - 7 x weekly - 1 sets - 5 reps  - Standing Low Shoulder Row with Anchored Resistance  - 1 x daily - 7 x weekly - 3 sets - 10 reps  - Shoulder Flexion Wall Slide with Towel  - 2 x daily - 7 x weekly - 1 sets - 10 reps  - Seated Cervical Retraction  - 2 x daily - 7 x weekly - 2 sets - 10 reps  - Standing Median Nerve Glide  - 1 x daily - 7 x weekly - 2 sets - 10 reps  - Thumb AROM Opposition To All Fingers  - 1 x daily - 7 x weekly - 2 sets - 10 reps    Charges: manual therapy x2, therapeutic exercise x1       Total Timed Treatment: 44 min  Total Treatment Time: 44 min

## 2024-12-16 NOTE — TELEPHONE ENCOUNTER
12/16//24 CT is pending review, await response from referrals for peer to peer information.    Ultrasound chest authorized   What Type Of Note Output Would You Prefer (Optional)?: Standard Output Hpi Title: Evaluation of Skin Lesions Have Your Spot(S) Been Treated In The Past?: has not been treated

## 2024-12-16 NOTE — TELEPHONE ENCOUNTER
Went for Ct today and was told it was denied and needed peer to peer.  Pt is scheduled for ultrasound 1/27/25.  Please call pt to advise

## 2024-12-17 ENCOUNTER — TELEPHONE (OUTPATIENT)
Dept: CASE MANAGEMENT | Age: 74
End: 2024-12-17

## 2024-12-17 ENCOUNTER — PATIENT MESSAGE (OUTPATIENT)
Dept: CASE MANAGEMENT | Age: 74
End: 2024-12-17

## 2024-12-17 DIAGNOSIS — R07.89 ATYPICAL CHEST PAIN: Primary | ICD-10-CM

## 2024-12-17 NOTE — TELEPHONE ENCOUNTER
CT Chest        Status: DENIED        Reference number [id number]     A copy of the denial letter is filed under the MEDIA tab, reference for complete details. You may reach out to Devaughn at [phone] to discuss decision.     Please reach out to patient with plan of care.      Thank you

## 2024-12-19 ENCOUNTER — OFFICE VISIT (OUTPATIENT)
Dept: PHYSICAL THERAPY | Facility: HOSPITAL | Age: 74
End: 2024-12-19
Attending: FAMILY MEDICINE
Payer: MEDICARE

## 2024-12-19 PROCEDURE — 97140 MANUAL THERAPY 1/> REGIONS: CPT

## 2024-12-19 PROCEDURE — 97110 THERAPEUTIC EXERCISES: CPT

## 2024-12-19 NOTE — PROGRESS NOTES
Diagnosis:   Sprain of rhomboid, initial encounter (S23.8XXA)    Referring Provider: Akash Stiles  Date of Evaluation:    12/12/2024    Precautions:  None Next MD visit: none scheduled  Date of Surgery: n/a   Insurance Primary/Secondary: [employer]ET[employer] [facility] / N/A     # Auth Visits: 8 POC            Subjective: The patient reports that things are feeling good. He had a little discomfort in the back of the arm, but thinks hed did the rows too much. He is not having numbness there, it feels like a muscle. He is not getting symptoms into the shoulder blade, and his shoulder ROM is good . The fingers are tingling and numb still.     Pain: 0/10 scapular pain      Objective:     12/13/2024  Cervical ext: 46, pinching. 55 after UPA, pulling, no pinch    12/16  Cervical extension: 55  Thumb extension L near equal JEANNE, finger abduction: weaker L 4+/5  : 70.4 on L   ULTT Base 1: no provovation of familiar symptoms, but the patient had increased pulling in L UE with increased forearm pulling as elbow extended.       12/19   R 102.6, L: 72.6  Extension: 55 no pain, after manual therapy        Assessment: The patient has noted improvements in his pain and centralization of symptoms. He no longer has pain with cervical extension range of motion and has pain free shoulder range of motion. The patient does still have impairments in  strength. Will continue to work on address myotomal strength and cervical spine range of motion.         Goals:   (to be met in 8 visits)  PROGRESSING  The patient will demonstrate at least 65 deg of cervical rotation AROM bilaterally  The patient will demonstrate at least 4+ finger abduction strength  The patient will demonstrate at least 50 deg pain free cervical extension AROM  The patient will demonstrate at least 70 pounds  strength on L UE  The patient will report being able to return to walking for exercise  The patient will report being able to return to tennis without limitation      Plan:  check  strength, cervical spine manual therapy  Date: 12/13/2024  TX#: 2/8 Date: 12/16                TX#: 3/8 Date: 12/19/2024           TX#: 4/8 Date:                 TX#: 5/ Date:   Tx#: 6/   Manual Therapy  UPA C6-7 grade III- on L  UPA C5-6 grade III on L  Manual traction in supine, intermittent, multiple bouts    X28 min Manual Therapy  UPA L C6-7 grade III+  Manual traction multiple bouts in supine    X26 min Manual Therapy  UPA C7-T1, T1-2  Thoracic CPA with skin lock t/o thoracic spine    X16 min     Therapeutic Exercise  Gripping green putty, issued for home  Seated cervical retraction 2x10  Low row red TB 2x10  Shoulder extension red TB 2x10  Towel slides on wall into flexion 2x10  X16 min Therapeutic Exercise  Open book 10x ea  Towel slides 2x10  Scapular retraction red TB 3x8  Putty opposition pinky to thumb yellow sponge 10x    x18 min Therapeutic Exercise  Open book 10x ea  Supine foam beam 3# flexion, horizontal abduction 10x ea  Pec stretch doorway 2x30 sec  Scapular retraction with ER red TB 3x10    X25 min     X X X     X X X     HEP:   Access Code: [email or web address]  URL: [email or web address]  Date: 12/16/2024  Prepared by: Aurora    Exercises  - Putty Squeezes  - 1 x daily - 7 x weekly - 1 sets - 5 reps  - Standing Low Shoulder Row with Anchored Resistance  - 1 x daily - 7 x weekly - 3 sets - 10 reps  - Shoulder Flexion Wall Slide with Towel  - 2 x daily - 7 x weekly - 1 sets - 10 reps  - Seated Cervical Retraction  - 2 x daily - 7 x weekly - 2 sets - 10 reps  - Standing Median Nerve Glide  - 1 x daily - 7 x weekly - 2 sets - 10 reps  - Thumb AROM Opposition To All Fingers  - 1 x daily - 7 x weekly - 2 sets - 10 reps    Charges: manual therapy x1, therapeutic exercise x2       Total Timed Treatment: 41 min  Total Treatment Time: 41 min

## 2025-01-02 ENCOUNTER — OFFICE VISIT (OUTPATIENT)
Dept: FAMILY MEDICINE CLINIC | Facility: CLINIC | Age: 75
End: 2025-01-02
Payer: MEDICARE

## 2025-01-02 VITALS
SYSTOLIC BLOOD PRESSURE: 104 MMHG | WEIGHT: 196 LBS | DIASTOLIC BLOOD PRESSURE: 54 MMHG | RESPIRATION RATE: 16 BRPM | BODY MASS INDEX: 27 KG/M2 | HEART RATE: 44 BPM | OXYGEN SATURATION: 95 % | TEMPERATURE: 98 F

## 2025-01-02 DIAGNOSIS — H10.32 ACUTE BACTERIAL CONJUNCTIVITIS OF LEFT EYE: Primary | ICD-10-CM

## 2025-01-02 PROCEDURE — 1159F MED LIST DOCD IN RCRD: CPT | Performed by: NURSE PRACTITIONER

## 2025-01-02 PROCEDURE — 99213 OFFICE O/P EST LOW 20 MIN: CPT | Performed by: NURSE PRACTITIONER

## 2025-01-02 PROCEDURE — 1160F RVW MEDS BY RX/DR IN RCRD: CPT | Performed by: NURSE PRACTITIONER

## 2025-01-02 RX ORDER — TOBRAMYCIN 3 MG/ML
1 SOLUTION/ DROPS OPHTHALMIC
Qty: 5 ML | Refills: 0 | Status: SHIPPED | OUTPATIENT
Start: 2025-01-02 | End: 2025-01-09

## 2025-01-02 NOTE — PROGRESS NOTES
Subjective:   Patient ID: Eriberto Bach is a 74 year old male.    Patient is a 74 year old male who presents today with complaints of left eye redness, irritation x 2 days. Woke up this morning and the eye was crusted shut. Denies eye pain, eye itching, contact use, injury to eye, visual changes, fevers or URI symptoms. Tolerating PO well at home. Attempted treatment prior to arrival = wash cloth to clean eye this morning.        History/Other:   Review of Systems   Constitutional:  Negative for appetite change and fever.   HENT:  Negative for congestion, rhinorrhea and sore throat.    Eyes:  Positive for discharge and redness. Negative for pain, itching and visual disturbance.   Respiratory:  Negative for cough.      Current Outpatient Medications   Medication Sig Dispense Refill    apixaban 5 MG Oral Tab Take 1 tablet (5 mg total) by mouth 2 (two) times daily.      tobramycin 0.3 % Ophthalmic Solution Place 1 drop into the left eye TID & HS for 7 days. 5 mL 0    aspirin 81 MG Oral Tab EC Take 1 tablet (81 mg total) by mouth daily.      lidocaine ( LIDOCAINE PATCH) 4 % External Patch Place 1 patch onto the skin daily. 12 hours on. 12 hours off. 15 patch 0    TERAZOSIN 5 MG Oral Cap TAKE ONE CAPSULE BY MOUTH NIGHTLY 90 capsule 0    Famciclovir 125 MG Oral Tab Take 1 tablet (125 mg total) by mouth 2 (two) times daily. 14 tablet 0    ibuprofen 800 MG Oral Tab 1 tab po BIDPC x 5 days, then BIDPC prn pain 30 tablet 0    HYDROcodone-acetaminophen (NORCO) 5-325 MG Oral Tab Take 1 tablet by mouth 2 (two) times daily as needed for Pain. (Patient not taking: Reported on 1/2/2025) 20 tablet 0     Allergies:Allergies[1]    /54   Pulse (!) 44   Temp 98.3 °F (36.8 °C) (Oral)   Resp 16   Wt 196 lb (88.9 kg)   SpO2 95%   BMI 26.58 kg/m²     Visual Acuity     Vision Screen Test Type: Snellen Wall Chart    Right Eye Visual Acuity: Corrected Right Eye Chart Acuity: 20/40   Left Eye Visual Acuity: Corrected Left  Eye Chart Acuity: 20/40   Both Eyes Visual Acuity: Corrected Both Eyes Chart Acuity: 20/30         Objective:   Physical Exam  Vitals reviewed.   Constitutional:       General: He is awake. He is not in acute distress.     Appearance: Normal appearance. He is well-developed and well-groomed. He is not ill-appearing, toxic-appearing or diaphoretic.   HENT:      Head: Normocephalic and atraumatic.   Eyes:      General: Lids are normal. Vision grossly intact.      Extraocular Movements: Extraocular movements intact.      Conjunctiva/sclera:      Right eye: Right conjunctiva is not injected.      Left eye: Left conjunctiva is injected.      Pupils: Pupils are equal, round, and reactive to light. Pupils are equal.   Cardiovascular:      Rate and Rhythm: Regular rhythm. Bradycardia present.      Comments: Patient notes this is his baseline HR  Pulmonary:      Effort: Pulmonary effort is normal. No respiratory distress.      Breath sounds: Normal breath sounds and air entry. No decreased breath sounds, wheezing, rhonchi or rales.   Skin:     General: Skin is warm and dry.   Neurological:      Mental Status: He is alert and oriented to person, place, and time.   Psychiatric:         Behavior: Behavior is cooperative.         Assessment & Plan:   1. Acute bacterial conjunctivitis of left eye        No orders of the defined types were placed in this encounter.      Meds This Visit:  Requested Prescriptions     Signed Prescriptions Disp Refills    tobramycin 0.3 % Ophthalmic Solution 5 mL 0     Sig: Place 1 drop into the left eye TID & HS for 7 days.     Reassuring physical exam findings. Vitals WNL.   START Tobrex today.  Supportive care and return to care measures reviewed.  Patient v/u and is comfortable with this plan.    Patient Instructions   1. Use prescribed eye drops Tobrex as directed.  2. Don't rub your eyes, as rubbing your eyes may make your symptoms worse.  If your eyes are itching or painful it may help to place  a cool compress over your eyes.  3. Perform good hand hygiene.    4. In bacterial conjunctivitis you are contagious for 24 hours after starting antibiotic eye drops.  5. Follow up with primary care physician and eye doctor as needed.  6. Seek emergency medical treatment for vision changes or loss, increased eye drainage, worsening of symptoms, uncontrolled pain/fever             [1]   Allergies  Allergen Reactions    Bees

## 2025-01-02 NOTE — PATIENT INSTRUCTIONS
1. Use prescribed eye drops Tobrex as directed.  2. Don't rub your eyes, as rubbing your eyes may make your symptoms worse.  If your eyes are itching or painful it may help to place a cool compress over your eyes.  3. Perform good hand hygiene.    4. In bacterial conjunctivitis you are contagious for 24 hours after starting antibiotic eye drops.  5. Follow up with primary care physician and eye doctor as needed.  6. Seek emergency medical treatment for vision changes or loss, increased eye drainage, worsening of symptoms, uncontrolled pain/fever

## 2025-01-03 ENCOUNTER — OFFICE VISIT (OUTPATIENT)
Dept: PHYSICAL THERAPY | Facility: HOSPITAL | Age: 75
End: 2025-01-03
Attending: FAMILY MEDICINE
Payer: MEDICARE

## 2025-01-03 PROCEDURE — 97140 MANUAL THERAPY 1/> REGIONS: CPT

## 2025-01-03 PROCEDURE — 97110 THERAPEUTIC EXERCISES: CPT

## 2025-01-03 NOTE — PROGRESS NOTES
Diagnosis:   Sprain of rhomboid, initial encounter (S23.8XXA)    Referring Provider: Akash Stiles  Date of Evaluation:    12/12/2024    Precautions:  None Next MD visit: none scheduled  Date of Surgery: n/a   Insurance Primary/Secondary: DANIEL [facility] / N/A     # Auth Visits: 8 POC            Subjective: The patient states that he has been feeling really good, he was able to lift the suitcase into the overhead compartment with no problems at all. He played tennis as well, and he has full motion of the arm. He only feels the neck pain when he turns the head all the way to the left side. He is not taking medication, but he has used icy hot sometimes as a precaution. He does not notice it in his daily life, but only when he is testing out the motion of the neck. He has been down in the shop working and it is not a problem. The numbness and tingling is still there. Sometimes it feels more pronounced, sometimes it feels like it is gone.     Pain: 0/10 at rest,       Objective:     12/13/2024  Cervical ext: 46, pinching. 55 after UPA, pulling, no pinch    12/16  Cervical extension: 55  Thumb extension L near equal JEANNE, finger abduction: weaker L 4+/5  : 70.4 on L   ULTT Base 1: no provovation of familiar symptoms, but the patient had increased pulling in L UE with increased forearm pulling as elbow extended.       12/19   R 102.6, L: 72.6  Extension: 55 no pain, after manual therapy    1/3  : 64.6 1st try, 70.2 2nd try  Finger abduction L: weaker 4/5    Cervical AROM: (* denotes performed with pain)  Flexion: 58 (was 58)  Extension: 45 (was 40 pain in scapula, 55 after UPA mobilizations)  Sidebending: R 18 25; L 21 20  Rotation: R 75 60; L 70 60 (shoulder pain reproduced)    Froments sign + on L        Assessment: The patient has noted improvements in his symptoms, and he notes minimal pain at this time. He does have continued  weakness and weakness into finger abduction. Advised for 1-2 more PT visits to  further improve myotomal strength to ensure that symptoms do not return in the future. Updated the nerve flossing exercise to an ulnar glide and updated his HEP pictures. Will continue to work on addressing cervical spine mobility and decrease pain.         Goals:   (to be met in 8 visits)  PROGRESSING  The patient will demonstrate at least 65 deg of cervical rotation AROM bilaterally  The patient will demonstrate at least 4+ finger abduction strength  The patient will demonstrate at least 50 deg pain free cervical extension AROM  The patient will demonstrate at least 70 pounds  strength on L UE  The patient will report being able to return to walking for exercise  The patient will report being able to return to tennis without limitation      Plan: check  strength, cervical spine manual therapy  Date: 12/13/2024  TX#: 2/8 Date: 12/16                TX#: 3/8 Date: 12/19/2024           TX#: 4/8 Date: 1/3/2025                TX#: 5/8 Date:   Tx#: 6/   Manual Therapy  UPA C6-7 grade III- on L  UPA C5-6 grade III on L  Manual traction in supine, intermittent, multiple bouts    X28 min Manual Therapy  UPA L C6-7 grade III+  Manual traction multiple bouts in supine    X26 min Manual Therapy  UPA C7-T1, T1-2  Thoracic CPA with skin lock t/o thoracic spine    X16 min Manual Therapy  UPA L C6-7 grade III+  Manual traction in cervical spine, multiple bouts    X16 min      Therapeutic Exercise  Gripping green putty, issued for home  Seated cervical retraction 2x10  Low row red TB 2x10  Shoulder extension red TB 2x10  Towel slides on wall into flexion 2x10  X16 min Therapeutic Exercise  Open book 10x ea  Towel slides 2x10  Scapular retraction red TB 3x8  Putty opposition pinky to thumb yellow sponge 10x    x18 min Therapeutic Exercise  Open book 10x ea  Supine foam beam 3# flexion, horizontal abduction 10x ea  Pec stretch doorway 2x30 sec  Scapular retraction with ER red TB 3x10    X25 min Therapeutic  Exercise  Reassessment/discuss updated HEP  Ulnar nerve flossing 2x10 on L  Open book rotation 10x ea  Thoracic rotation hand behind head on R sidelying 10x  Instructions to work on thumb flexion with sponge    X26 min    X X X X    X X X X    HEP:   Access Code: [email or web address]  URL: [email or web address]  Date: 01/03/2025  Prepared by: Aurora    Exercises  - Putty Squeezes  - 1 x daily - 7 x weekly - 1 sets - 5 reps  - Standing Low Shoulder Row with Anchored Resistance  - 1 x daily - 7 x weekly - 3 sets - 10 reps  - Shoulder Flexion Wall Slide with Towel  - 2 x daily - 7 x weekly - 1 sets - 10 reps  - Seated Cervical Retraction  - 2 x daily - 7 x weekly - 2 sets - 10 reps  - Thumb AROM Opposition To All Fingers  - 1 x daily - 7 x weekly - 2 sets - 10 reps  - Ulnar Nerve Flossing  - 2 x daily - 7 x weekly - 2 sets - 10 reps    Charges: manual therapy x1, therapeutic exercise x2       Total Timed Treatment: 42 min  Total Treatment Time: 42 min

## 2025-01-07 ENCOUNTER — OFFICE VISIT (OUTPATIENT)
Dept: PHYSICAL THERAPY | Facility: HOSPITAL | Age: 75
End: 2025-01-07
Attending: FAMILY MEDICINE
Payer: MEDICARE

## 2025-01-07 PROCEDURE — 97110 THERAPEUTIC EXERCISES: CPT

## 2025-01-07 PROCEDURE — 97140 MANUAL THERAPY 1/> REGIONS: CPT

## 2025-01-07 NOTE — PROGRESS NOTES
Diagnosis:   Sprain of rhomboid, initial encounter (S23.8XXA)    Referring Provider: Akash Stiles  Date of Evaluation:    12/12/2024    Precautions:  None Next MD visit: none scheduled  Date of Surgery: n/a   Insurance Primary/Secondary: DANIEL [facility] / N/A     # Auth Visits: 8 POC           Progress Summary  Pt has attended 6 visits in Physical Therapy.     Subjective: The patient reports that the neck feels good. He played tennis last night, he states that it was fine. He is not noticing the neck at all. He was not sore after the last session, and he is not taking any meds. He is comfortable with today being the last day. He reports that he was on his treadmill walking as well. He has been doing his woodworking with no issues.     Pain: 0/10 at rest       Objective:     12/13/2024  Cervical ext: 46, pinching. 55 after UPA, pulling, no pinch    12/16  Cervical extension: 55  Thumb extension L near equal JEANNE, finger abduction: weaker L 4+/5  : 70.4 on L   ULTT Base 1: no provovation of familiar symptoms, but the patient had increased pulling in L UE with increased forearm pulling as elbow extended.       12/19   R 102.6, L: 72.6  Extension: 55 no pain, after manual therapy    1/3  : 64.6 1st try, 70.2 2nd try  Finger abduction L: weaker 4/5    Cervical AROM: (* denotes performed with pain)  Flexion: 58 (was 58)  Extension: 45 (was 40 pain in scapula, 55 after UPA mobilizations)  Sidebending: R 18 25; L 21 20  Rotation: R 75 (was 60); L 70 (was 60) (shoulder pain reproduced)    Froments sign + on L      1/7  Finger abduction slightly weaker on L 4-4+/5  : R 105, L 75.2 lbs    Cervical AROM  Extension: 55  Flexion 55  Rotation R 70, L 65  Lateral flexion: R 20, L 24        Assessment: The patient at this time is not reporting any pain in the scapula, cervical spine, or the arm. His cervical spine range of motion is improved and now is pain free. The patient does have some continued weakness into finger  abduction and  strength. He requests to hold on PT at this time as he continues with his HEP. Educated the patient that if he has any progression of symptoms in his arm or any progressing weakness, he should follow up with his MD. Will hold PT and have pt work on HEP at this time. If he has any flare ups or requires additional physical therapy visits he can make a follow up appointment for physical therapy in the next 1-2 months.         Goals:   (to be met in 8 visits)  PROGRESSING  The patient will demonstrate at least 65 deg of cervical rotation AROM bilaterally MET  The patient will demonstrate at least 4+ finger abduction strength PARTIALLY MET  The patient will demonstrate at least 50 deg pain free cervical extension AROM MET    The patient will demonstrate at least 70 pounds  strength on L UE  MET  The patient will report being able to return to walking for exercise MET  The patient will report being able to return to tennis without limitation  MET      .Oswestry Disability Index Score  Score: (Patient-Rptd) 28 % (12/12/2024 10:18 AM)    Post Oswestry Disability Index Score  Post Score: (Patient-Rptd) 10 % (1/7/2025 11:20 AM)    18 % improvement    Plan: Will hold PT at this time as the patient continues to work on home exercises. He will follow up as needed.      Patient/Family/Caregiver was advised of these findings, precautions, and treatment options and has agreed to actively participate in planning and for this course of care.    Thank you for your referral. If you have any questions, please contact me at Dept: [phone].    Sincerely,  Electronically signed by therapist: Aurora Westbrook, PT       Certification From: 1/7/2025  To:4/7/2025         Plan: check  strength, cervical spine manual therapy  Date: 12/13/2024  TX#: 2/8 Date: 12/16                TX#: 3/8 Date: 12/19/2024           TX#: 4/8 Date: 1/3/2025                TX#: 5/8 Date: 1/7/2025  Tx#: 6/8   Manual Therapy  UPA C6-7 grade  III- on L  UPA C5-6 grade III on L  Manual traction in supine, intermittent, multiple bouts    X28 min Manual Therapy  UPA L C6-7 grade III+  Manual traction multiple bouts in supine    X26 min Manual Therapy  UPA C7-T1, T1-2  Thoracic CPA with skin lock t/o thoracic spine    X16 min Manual Therapy  UPA L C6-7 grade III+  Manual traction in cervical spine, multiple bouts    X16 min   Manual Therapy  Manual traction in cervical spine, multiple bouts    X9 min   Therapeutic Exercise  Gripping green putty, issued for home  Seated cervical retraction 2x10  Low row red TB 2x10  Shoulder extension red TB 2x10  Towel slides on wall into flexion 2x10  X16 min Therapeutic Exercise  Open book 10x ea  Towel slides 2x10  Scapular retraction red TB 3x8  Putty opposition pinky to thumb yellow sponge 10x    x18 min Therapeutic Exercise  Open book 10x ea  Supine foam beam 3# flexion, horizontal abduction 10x ea  Pec stretch doorway 2x30 sec  Scapular retraction with ER red TB 3x10    X25 min Therapeutic Exercise  Reassessment/discuss updated HEP  Ulnar nerve flossing 2x10 on L  Open book rotation 10x ea  Thoracic rotation hand behind head on R sidelying 10x  Instructions to work on thumb flexion with sponge    X26 min Therapeutic Exercise  Reassesment of objective findings  Low row blue TB 10x  Scapular retraction with ER red TB 3x10    X20 min   X X X X X   X X X X X   HEP:   Access Code: [email or web address]  URL: [email or web address]  Date: 01/03/2025  Prepared by: Aurora    Exercises  - Putty Squeezes  - 1 x daily - 7 x weekly - 1 sets - 5 reps  - Standing Low Shoulder Row with Anchored Resistance  - 1 x daily - 7 x weekly - 3 sets - 10 reps  - Shoulder Flexion Wall Slide with Towel  - 2 x daily - 7 x weekly - 1 sets - 10 reps  - Seated Cervical Retraction  - 2 x daily - 7 x weekly - 2 sets - 10 reps  - Thumb AROM Opposition To All Fingers  - 1 x daily - 7 x weekly - 2 sets - 10 reps  - Ulnar Nerve Flossing  - 2 x daily - 7 x weekly  - 2 sets - 10 reps      Charges: manual therapy x1, therapeutic exercise x1       Total Timed Treatment: 29 min  Total Treatment Time: 29 min

## 2025-01-09 DIAGNOSIS — N40.1 BENIGN PROSTATIC HYPERPLASIA WITH NOCTURIA: ICD-10-CM

## 2025-01-09 DIAGNOSIS — R35.1 BENIGN PROSTATIC HYPERPLASIA WITH NOCTURIA: ICD-10-CM

## 2025-01-09 RX ORDER — TERAZOSIN 5 MG/1
5 CAPSULE ORAL NIGHTLY
Qty: 90 CAPSULE | Refills: 0 | Status: SHIPPED | OUTPATIENT
Start: 2025-01-09

## 2025-01-16 ENCOUNTER — APPOINTMENT (OUTPATIENT)
Dept: PHYSICAL THERAPY | Facility: HOSPITAL | Age: 75
End: 2025-01-16
Payer: MEDICARE

## 2025-01-21 ENCOUNTER — APPOINTMENT (OUTPATIENT)
Dept: PHYSICAL THERAPY | Facility: HOSPITAL | Age: 75
End: 2025-01-21
Payer: MEDICARE

## 2025-01-22 DIAGNOSIS — B00.1 RECURRENT COLD SORES: ICD-10-CM

## 2025-01-22 RX ORDER — FAMCICLOVIR 125 MG/1
125 TABLET ORAL 2 TIMES DAILY
Qty: 14 TABLET | Refills: 1 | Status: SHIPPED | OUTPATIENT
Start: 2025-01-22

## 2025-01-23 ENCOUNTER — APPOINTMENT (OUTPATIENT)
Dept: PHYSICAL THERAPY | Facility: HOSPITAL | Age: 75
End: 2025-01-23
Payer: MEDICARE

## 2025-01-27 ENCOUNTER — HOSPITAL ENCOUNTER (OUTPATIENT)
Dept: ULTRASOUND IMAGING | Facility: HOSPITAL | Age: 75
Discharge: HOME | End: 2025-01-27
Attending: FAMILY MEDICINE
Payer: MEDICARE

## 2025-01-27 DIAGNOSIS — R07.89 ATYPICAL CHEST PAIN: ICD-10-CM

## 2025-01-27 PROCEDURE — 76604 US EXAM CHEST: CPT | Performed by: FAMILY MEDICINE

## 2025-02-11 ENCOUNTER — OFFICE VISIT (OUTPATIENT)
Dept: FAMILY MEDICINE CLINIC | Facility: CLINIC | Age: 75
End: 2025-02-11
Payer: MEDICARE

## 2025-02-11 ENCOUNTER — LAB ENCOUNTER (OUTPATIENT)
Dept: LAB | Age: 75
End: 2025-02-11
Attending: FAMILY MEDICINE
Payer: MEDICARE

## 2025-02-11 VITALS
BODY MASS INDEX: 26.55 KG/M2 | OXYGEN SATURATION: 98 % | HEIGHT: 72 IN | WEIGHT: 196 LBS | SYSTOLIC BLOOD PRESSURE: 100 MMHG | RESPIRATION RATE: 16 BRPM | HEART RATE: 39 BPM | DIASTOLIC BLOOD PRESSURE: 50 MMHG

## 2025-02-11 DIAGNOSIS — N18.31 STAGE 3A CHRONIC KIDNEY DISEASE (HCC): ICD-10-CM

## 2025-02-11 DIAGNOSIS — Z12.5 SCREENING FOR PROSTATE CANCER: ICD-10-CM

## 2025-02-11 DIAGNOSIS — I48.91 ATRIAL FIBRILLATION, UNSPECIFIED TYPE (HCC): ICD-10-CM

## 2025-02-11 DIAGNOSIS — Z00.00 ENCOUNTER FOR ANNUAL HEALTH EXAMINATION: Primary | ICD-10-CM

## 2025-02-11 DIAGNOSIS — Z00.00 ENCOUNTER FOR ANNUAL HEALTH EXAMINATION: ICD-10-CM

## 2025-02-11 LAB — COMPLEXED PSA SERPL-MCNC: 1.84 NG/ML (ref ?–4)

## 2025-02-11 PROCEDURE — 96160 PT-FOCUSED HLTH RISK ASSMT: CPT | Performed by: FAMILY MEDICINE

## 2025-02-11 PROCEDURE — G0439 PPPS, SUBSEQ VISIT: HCPCS | Performed by: FAMILY MEDICINE

## 2025-02-11 PROCEDURE — 1170F FXNL STATUS ASSESSED: CPT | Performed by: FAMILY MEDICINE

## 2025-02-11 PROCEDURE — 1159F MED LIST DOCD IN RCRD: CPT | Performed by: FAMILY MEDICINE

## 2025-02-11 PROCEDURE — 1160F RVW MEDS BY RX/DR IN RCRD: CPT | Performed by: FAMILY MEDICINE

## 2025-02-11 PROCEDURE — 1126F AMNT PAIN NOTED NONE PRSNT: CPT | Performed by: FAMILY MEDICINE

## 2025-02-11 PROCEDURE — 36415 COLL VENOUS BLD VENIPUNCTURE: CPT

## 2025-02-11 RX ORDER — ROSUVASTATIN CALCIUM 20 MG/1
20 TABLET, COATED ORAL DAILY
COMMUNITY
Start: 2025-01-16 | End: 2026-01-16

## 2025-02-11 NOTE — PROGRESS NOTES
Subjective:   Eriberto Bach is a 74 year old male who presents for a MA AHA (Medicare Advantage Annual Health Assessment) and Medicare Subsequent Annual Wellness visit (Pt already had Initial Annual Wellness) and scheduled follow up of multiple significant but stable problems.   Playing tennis, walking, feeling well, not getting dizzy.    Reviewed labs.    No other big changes. Feeling great.    Household is stable.        History/Other:   Fall Risk Assessment:   He has been screened for Falls and is low risk.      Cognitive Assessment:   He had a completely normal cognitive assessment - see flowsheet entries     Functional Ability/Status:   Eriberto Bach has a completely normal functional assessment. See flowsheet for details.        Depression Screening (PHQ):  PHQ-2 SCORE: 0  , done 2/10/2025     Advanced Directives:   He does have a Living Will but we do NOT have it on file in Epic.    He does have a POA but we do NOT have it on file in Epic.    Discussed Advance Care Planning with patient (and family/surrogate if present). Standard forms made available to patient in After Visit Summary.      Patient Active Problem List   Diagnosis    Impotence of organic origin    Benign prostatic hyperplasia with nocturia    H/O bilateral inguinal hernia repair    CKD (chronic kidney disease) stage 3, GFR 30-59 ml/min ([facility])    History of adenomatous polyp of colon    Thrombocytopenia    Atrial fibrillation, unspecified type ([facility])     Allergies:  He is allergic to bees.    Current Medications:  Outpatient Medications Marked as Taking for the 2/11/25 encounter (Office Visit) with Bryn Helm MD   Medication Sig    rosuvastatin 20 MG Oral Tab Take 1 tablet (20 mg total) by mouth daily.    Famciclovir 125 MG Oral Tab TAKE ONE TABLET BY MOUTH TWICE DAILY    TERAZOSIN 5 MG Oral Cap TAKE ONE CAPSULE BY MOUTH NIGHTLY    apixaban 5 MG Oral Tab Take 1 tablet (5 mg total) by mouth 2 (two) times daily.    aspirin 81 MG  Oral Tab EC Take 1 tablet (81 mg total) by mouth daily.    lidocaine (HM LIDOCAINE PATCH) 4 % External Patch Place 1 patch onto the skin daily. 12 hours on. 12 hours off.    ibuprofen 800 MG Oral Tab 1 tab po BIDPC x 5 days, then BIDPC prn pain       Medical History:  He  has a past medical history of Abdominal hernia, Cancer (HCC), Frequent urination, Heart palpitations, Visual impairment, and Wears glasses.  Surgical History:  He  has a past surgical history that includes vasectomy; forearm/wrist surgery unlisted; hernia surgery; hernia surgery (2016); and colonoscopy (2015).   Family History:  His family history includes Cancer in his brother; Heart Attack in his brother; Heart Disorder (age of onset: 48) in his brother.  Social History:  He  reports that he quit smoking about 54 years ago. His smoking use included cigarettes. He has never used smokeless tobacco. He reports current alcohol use of about 4.0 standard drinks of alcohol per week. He reports that he does not use drugs.    Tobacco:  He smoked tobacco in the past but quit greater than 12 months ago.  Social History     Tobacco Use   Smoking Status Former    Current packs/day: 0.00    Types: Cigarettes    Quit date: 1970    Years since quittin.7   Smokeless Tobacco Never          CAGE Alcohol Screen:   He has been screened for alcohol abuse and his score is not 0:  Cut: Have you ever felt you should Cut down on your drinking?: (Patient-Rptd) Yes  Annoyed: Have people Annoyed you by criticizing your drinking?: (Patient-Rptd) Yes  Guilty: Have you ever felt bad or Guilty about your drinking?: (Patient-Rptd) No  Eye Opener: Have you ever had a drink first thing in the morning to steady your nerves or to get rid of a hangover (Eye opener)?: (Patient-Rptd) No  Total Score: (Patient-Rptd) 2      Patient Care Team:  Bryn Helm MD as PCP - General (Family Medicine)  Aurora Westbrook PT as Physical Therapist (Physical Therapy)    Review of  Systems  Negative except as noted in HPI    Objective:   Physical Exam  General Appearance:  Alert, cooperative, no distress, appears stated age   Head:  Normocephalic, without obvious abnormality, atraumatic   Eyes:  PERRL, conjunctiva/corneas clear, EOM's intact, both eyes   Ears:  Normal TM's and external ear canals, both ears   Nose: Nares normal, septum midline, mucosa normal, no drainage or sinus tenderness   Throat: Lips, mucosa, and tongue normal; teeth and gums normal   Neck: Supple, symmetrical, trachea midline, no adenopathy, thyroid: not enlarged, symmetric, no tenderness/mass/nodules, no carotid bruit or JVD   Back:   Symmetric, no curvature, ROM normal, no CVA tenderness   Lungs:   Clear to auscultation bilaterally, respirations unlabored   Chest Wall:  No tenderness or deformity   Heart:  Regular rate and rhythm, S1, S2 normal, no murmur, rub or gallop   Abdomen:   Soft, non-tender, bowel sounds active all four quadrants,  no masses, no organomegaly           Extremities: Extremities normal, atraumatic, no cyanosis or edema   Pulses: 2+ and symmetric   Skin: Skin color, texture, turgor normal, no rashes or lesions   Lymph nodes: Cervical, supraclavicular, and axillary nodes normal   Neurologic: Normal     /50   Pulse (!) 39   Resp 16   Ht 6' (1.829 m)   Wt 196 lb (88.9 kg)   SpO2 98%   BMI 26.58 kg/m²  Estimated body mass index is 26.58 kg/m² as calculated from the following:    Height as of this encounter: 6' (1.829 m).    Weight as of this encounter: 196 lb (88.9 kg).    Medicare Hearing Assessment:   Hearing Screening    Screening Method: Finger Rub  Finger Rub Result: Pass         Visual Acuity:   Right Eye Visual Acuity: Corrected Right Eye Chart Acuity: 20/30   Left Eye Visual Acuity: Corrected Left Eye Chart Acuity: 20/40   Both Eyes Visual Acuity: Corrected Both Eyes Chart Acuity: 20/30   Able To Tolerate Visual Acuity: Yes        Assessment & Plan:   Eriberto Bach is a 74  year old male who presents for a Medicare Assessment.     1. Encounter for annual health examination (Primary)  -     PSA Total, Screen; Future; Expected date: 02/11/2025  2. Atrial fibrillation, unspecified type (HCC)- Controlled, follows with cardiology, this is relatively new diagnsois, but on appropriate medications  3. Stage 3a chronic kidney disease (HCC) - Stable on last labs in November, can recheck later this year  4. Screening for prostate cancer  -     PSA Total, Screen; Future; Expected date: 02/11/2025    The patient indicates understanding of these issues and agrees to the plan.  Continue with current treatment plan.  Lab work ordered.  Reinforced healthy diet, lifestyle, and exercise.      No follow-ups on file.     Bryn Helm MD, 2/11/2025     Supplementary Documentation:   General Health:  In the past six months, have you lost more than 10 pounds without trying?: (Patient-Rptd) 2 - No  Has your appetite been poor?: (Patient-Rptd) No  Type of Diet: (Patient-Rptd) Balanced  How does the patient maintain a good energy level?: (Patient-Rptd) Daily Walks;Other  How would you describe your daily physical activity?: (Patient-Rptd) Moderate  How would you describe your current health state?: (Patient-Rptd) Good  How do you maintain positive mental well-being?: (Patient-Rptd) Social Interaction;Puzzles  On a scale of 0 to 10, with 0 being no pain and 10 being severe pain, what is your pain level?: (Patient-Rptd) 0 - (None)  In the past six months, have you experienced urine leakage?: (Patient-Rptd) 0-No  At any time do you feel concerned for the safety/well-being of yourself and/or your children, in your home or elsewhere?: (Patient-Rptd) No  Have you had any immunizations at another office such as Influenza, Hepatitis B, Tetanus, or Pneumococcal?: (Patient-Rptd) No    Health Maintenance   Topic Date Due    Annual Well Visit  01/01/2025    Annual Depression Screening  01/01/2025    Colorectal Cancer  Screening  07/06/2025    PSA  02/13/2026    Influenza Vaccine  Completed    Fall Risk Screening (Annual)  Completed    Pneumococcal Vaccine: 50+ Years  Completed    Zoster Vaccines  Completed    COVID-19 Vaccine  Completed    Meningococcal B Vaccine  Aged Out

## 2025-04-10 DIAGNOSIS — N40.1 BENIGN PROSTATIC HYPERPLASIA WITH NOCTURIA: ICD-10-CM

## 2025-04-10 DIAGNOSIS — R35.1 BENIGN PROSTATIC HYPERPLASIA WITH NOCTURIA: ICD-10-CM

## 2025-04-10 RX ORDER — TERAZOSIN 5 MG/1
5 CAPSULE ORAL NIGHTLY
Qty: 90 CAPSULE | Refills: 0 | Status: SHIPPED | OUTPATIENT
Start: 2025-04-10 | End: 2025-07-07

## 2025-07-05 DIAGNOSIS — R35.1 BENIGN PROSTATIC HYPERPLASIA WITH NOCTURIA: ICD-10-CM

## 2025-07-05 DIAGNOSIS — N40.1 BENIGN PROSTATIC HYPERPLASIA WITH NOCTURIA: ICD-10-CM

## 2025-07-07 RX ORDER — TERAZOSIN 5 MG/1
5 CAPSULE ORAL NIGHTLY
Qty: 90 CAPSULE | Refills: 0 | Status: SHIPPED | OUTPATIENT
Start: 2025-07-07

## 2025-08-12 ENCOUNTER — LAB ENCOUNTER (OUTPATIENT)
Dept: LAB | Age: 75
End: 2025-08-12
Attending: INTERNAL MEDICINE

## 2025-08-12 DIAGNOSIS — E78.00 PURE HYPERCHOLESTEROLEMIA: ICD-10-CM

## 2025-08-12 DIAGNOSIS — I25.10 CORONARY ATHEROSCLEROSIS OF NATIVE CORONARY ARTERY: ICD-10-CM

## 2025-08-12 DIAGNOSIS — I48.92 ATRIAL FLUTTER (HCC): Primary | ICD-10-CM

## 2025-08-12 DIAGNOSIS — R00.1 SEVERE SINUS BRADYCARDIA: ICD-10-CM

## 2025-08-12 LAB
ALBUMIN SERPL-MCNC: 4.6 G/DL (ref 3.2–4.8)
ALBUMIN/GLOB SERPL: 2.4 (ref 1–2)
ALP LIVER SERPL-CCNC: 64 U/L (ref 45–117)
ALT SERPL-CCNC: 11 U/L (ref 10–49)
ANION GAP SERPL CALC-SCNC: 10 MMOL/L (ref 0–18)
AST SERPL-CCNC: 22 U/L (ref ?–34)
BASOPHILS # BLD AUTO: 0.06 X10(3) UL (ref 0–0.2)
BASOPHILS NFR BLD AUTO: 1.1 %
BILIRUB SERPL-MCNC: 0.8 MG/DL (ref 0.2–1.1)
BUN BLD-MCNC: 20 MG/DL (ref 9–23)
CALCIUM BLD-MCNC: 9.8 MG/DL (ref 8.7–10.6)
CHLORIDE SERPL-SCNC: 107 MMOL/L (ref 98–112)
CHOLEST SERPL-MCNC: 119 MG/DL (ref ?–200)
CO2 SERPL-SCNC: 27 MMOL/L (ref 21–32)
CREAT BLD-MCNC: 1.26 MG/DL (ref 0.7–1.3)
EGFRCR SERPLBLD CKD-EPI 2021: 59 ML/MIN/1.73M2 (ref 60–?)
EOSINOPHIL # BLD AUTO: 0.1 X10(3) UL (ref 0–0.7)
EOSINOPHIL NFR BLD AUTO: 1.8 %
ERYTHROCYTE [DISTWIDTH] IN BLOOD BY AUTOMATED COUNT: 13.2 %
FASTING PATIENT LIPID ANSWER: YES
FASTING STATUS PATIENT QL REPORTED: YES
GLOBULIN PLAS-MCNC: 1.9 G/DL (ref 2–3.5)
GLUCOSE BLD-MCNC: 92 MG/DL (ref 70–99)
HCT VFR BLD AUTO: 41.3 % (ref 39–53)
HDLC SERPL-MCNC: 57 MG/DL (ref 40–59)
HGB BLD-MCNC: 14 G/DL (ref 13–17.5)
IMM GRANULOCYTES # BLD AUTO: 0.02 X10(3) UL (ref 0–1)
IMM GRANULOCYTES NFR BLD: 0.4 %
LDLC SERPL CALC-MCNC: 50 MG/DL (ref ?–100)
LYMPHOCYTES # BLD AUTO: 1.53 X10(3) UL (ref 1–4)
LYMPHOCYTES NFR BLD AUTO: 27.5 %
MCH RBC QN AUTO: 32.4 PG (ref 26–34)
MCHC RBC AUTO-ENTMCNC: 33.9 G/DL (ref 31–37)
MCV RBC AUTO: 95.6 FL (ref 80–100)
MONOCYTES # BLD AUTO: 0.47 X10(3) UL (ref 0.1–1)
MONOCYTES NFR BLD AUTO: 8.4 %
NEUTROPHILS # BLD AUTO: 3.39 X10 (3) UL (ref 1.5–7.7)
NEUTROPHILS # BLD AUTO: 3.39 X10(3) UL (ref 1.5–7.7)
NEUTROPHILS NFR BLD AUTO: 60.8 %
NONHDLC SERPL-MCNC: 62 MG/DL (ref ?–130)
OSMOLALITY SERPL CALC.SUM OF ELEC: 300 MOSM/KG (ref 275–295)
PLATELET # BLD AUTO: 140 10(3)UL (ref 150–450)
POTASSIUM SERPL-SCNC: 4 MMOL/L (ref 3.5–5.1)
PROT SERPL-MCNC: 6.5 G/DL (ref 5.7–8.2)
RBC # BLD AUTO: 4.32 X10(6)UL (ref 3.8–5.8)
SODIUM SERPL-SCNC: 144 MMOL/L (ref 136–145)
TRIGL SERPL-MCNC: 51 MG/DL (ref 30–149)
VLDLC SERPL CALC-MCNC: 7 MG/DL (ref 0–30)
WBC # BLD AUTO: 5.6 X10(3) UL (ref 4–11)

## 2025-08-12 PROCEDURE — 80061 LIPID PANEL: CPT

## 2025-08-12 PROCEDURE — 85025 COMPLETE CBC W/AUTO DIFF WBC: CPT

## 2025-08-12 PROCEDURE — 80053 COMPREHEN METABOLIC PANEL: CPT

## 2025-08-12 PROCEDURE — 36415 COLL VENOUS BLD VENIPUNCTURE: CPT

## (undated) NOTE — MR AVS SNAPSHOT
[address]  [address] [facility], [address] [address] [phone]               Thank you for choosing us for your health care visit with Kishan Ma DO.   We are glad to serve you and happy to provide you with this Laboratory exam ordered as part of routine general medical examination    -  Primary    BPH associated with nocturia          Instructions and Information about Your Health     None      Allergies as of Feb 23, 2017     Bees                 Today's Vital

## (undated) NOTE — LETTER
10/03/17  October 3, 2017        [address]      Dear Saroj Olsen:    Our records indicate that you have outstanding Lab work that was ordered for you on 3/3/2017 and has not yet been completed.    To provide you wit

## (undated) NOTE — LETTER
07/15/19        Nellie Ledezma 75      Dear Love Barth,    [address] [facility] records indicate that you have outstanding lab work and or testing that was ordered for you and has not yet been completed:  Orders Placed This Encounter      BUN [E]      Creatinine, Serum [E]    To provide you with the best possible care, please complete these orders at your earliest convenience. If you have recently completed these orders please disregard this letter. If you have any questions please call the office at Dept: [phone].      Thank you,       Yessi Navarro, DO

## (undated) NOTE — LETTER
Patient Name: Eriberto Bach  YOB: 1950          MRN :  [id number]  Date:  1/8/2025  Referring Physician:  Akash Stiles    Progress Summary  Pt has attended 6 visits in Physical Therapy.     Subjective: The patient reports that the neck feels good. He played tennis last night, he states that it was fine. He is not noticing the neck at all. He was not sore after the last session, and he is not taking any meds. He is comfortable with today being the last day. He reports that he was on his treadmill walking as well. He has been doing his woodworking with no issues.     Pain: 0/10 at rest       Objective:     12/13/2024  Cervical ext: 46, pinching. 55 after UPA, pulling, no pinch    12/16  Cervical extension: 55  Thumb extension L near equal JEANNE, finger abduction: weaker L 4+/5  : 70.4 on L   ULTT Base 1: no provovation of familiar symptoms, but the patient had increased pulling in L UE with increased forearm pulling as elbow extended.       12/19   R 102.6, L: 72.6  Extension: 55 no pain, after manual therapy    1/3  : 64.6 1st try, 70.2 2nd try  Finger abduction L: weaker 4/5    Cervical AROM: (* denotes performed with pain)  Flexion: 58 (was 58)  Extension: 45 (was 40 pain in scapula, 55 after UPA mobilizations)  Sidebending: R 18 25; L 21 20  Rotation: R 75 (was 60); L 70 (was 60) (shoulder pain reproduced)    Froments sign + on L      1/7  Finger abduction slightly weaker on L 4-4+/5  : R 105, L 75.2 lbs    Cervical AROM  Extension: 55  Flexion 55  Rotation R 70, L 65  Lateral flexion: R 20, L 24        Assessment: The patient at this time is not reporting any pain in the scapula, cervical spine, or the arm. His cervical spine range of motion is improved and now is pain free. The patient does have some continued weakness into finger abduction and  strength. He requests to hold on PT at this time as he continues with his HEP. Educated the patient that if he has any  progression of symptoms in his arm or any progressing weakness, he should follow up with his MD. Will hold PT and have pt work on HEP at this time. If he has any flare ups or requires additional physical therapy visits he can make a follow up appointment for physical therapy in the next 1-2 months.         Goals:   (to be met in 8 visits)  PROGRESSING  The patient will demonstrate at least 65 deg of cervical rotation AROM bilaterally MET  The patient will demonstrate at least 4+ finger abduction strength PARTIALLY MET  The patient will demonstrate at least 50 deg pain free cervical extension AROM MET    The patient will demonstrate at least 70 pounds  strength on L UE  MET  The patient will report being able to return to walking for exercise MET  The patient will report being able to return to tennis without limitation  MET      .Oswestry Disability Index Score  Score: (Patient-Rptd) 28 % (12/12/2024 10:18 AM)    Post Oswestry Disability Index Score  Post Score: (Patient-Rptd) 10 % (1/7/2025 11:20 AM)    18 % improvement    Plan: Will hold PT at this time as the patient continues to work on home exercises. He will follow up as needed.      Patient/Family/Caregiver was advised of these findings, precautions, and treatment options and has agreed to actively participate in planning and for this course of care.    Thank you for your referral. If you have any questions, please contact me at Dept: [phone].    Sincerely,  Electronically signed by therapist: Aurora Westbrook, PT       Certification From: 1/7/2025  To:4/7/2025            21st Century Cures Act Notice to Patient: Medical documents like this are made available to patients in the interest of transparency. However, be advised this is a medical document and it is intended as [id number] communication between your medical providers. This medical document may contain abbreviations, assessments, medical data, and results or other terms that are unfamiliar.  Medical documents are intended to carry relevant information, facts as evident, and the clinical opinion of the practitioner. As such, this medical document may be written in language that appears blunt or direct. You are encouraged to contact your medical provider and/or [facility] Patient Experience if you have any questions about this medical document.